# Patient Record
Sex: MALE | Race: BLACK OR AFRICAN AMERICAN | NOT HISPANIC OR LATINO | Employment: FULL TIME | URBAN - METROPOLITAN AREA
[De-identification: names, ages, dates, MRNs, and addresses within clinical notes are randomized per-mention and may not be internally consistent; named-entity substitution may affect disease eponyms.]

---

## 2017-04-09 ENCOUNTER — HOSPITAL ENCOUNTER (EMERGENCY)
Facility: HOSPITAL | Age: 32
Discharge: HOME | End: 2017-04-09
Attending: FAMILY MEDICINE

## 2017-04-09 VITALS
SYSTOLIC BLOOD PRESSURE: 127 MMHG | OXYGEN SATURATION: 97 % | HEIGHT: 69 IN | HEART RATE: 54 BPM | RESPIRATION RATE: 20 BRPM | DIASTOLIC BLOOD PRESSURE: 75 MMHG | BODY MASS INDEX: 23.99 KG/M2 | WEIGHT: 162 LBS | TEMPERATURE: 98 F

## 2017-04-09 DIAGNOSIS — R07.9 CHEST PAIN: ICD-10-CM

## 2017-04-09 DIAGNOSIS — R07.9 CHEST PAIN, UNSPECIFIED TYPE: Primary | ICD-10-CM

## 2017-04-09 LAB
ALBUMIN SERPL BCP-MCNC: 4.1 G/DL
ALP SERPL-CCNC: 64 U/L
ALT SERPL W/O P-5'-P-CCNC: 14 U/L
ANION GAP SERPL CALC-SCNC: 11 MMOL/L
AST SERPL-CCNC: 22 U/L
BASOPHILS # BLD AUTO: 0.04 K/UL
BASOPHILS NFR BLD: 0.6 %
BILIRUB SERPL-MCNC: 0.9 MG/DL
BUN SERPL-MCNC: 15 MG/DL
CALCIUM SERPL-MCNC: 9.6 MG/DL
CHLORIDE SERPL-SCNC: 101 MMOL/L
CO2 SERPL-SCNC: 26 MMOL/L
CREAT SERPL-MCNC: 1.1 MG/DL
DIFFERENTIAL METHOD: ABNORMAL
EOSINOPHIL # BLD AUTO: 0.2 K/UL
EOSINOPHIL NFR BLD: 2.2 %
ERYTHROCYTE [DISTWIDTH] IN BLOOD BY AUTOMATED COUNT: 12.7 %
EST. GFR  (AFRICAN AMERICAN): >60 ML/MIN/1.73 M^2
EST. GFR  (NON AFRICAN AMERICAN): >60 ML/MIN/1.73 M^2
GLUCOSE SERPL-MCNC: 108 MG/DL
HCT VFR BLD AUTO: 42.7 %
HGB BLD-MCNC: 15 G/DL
LIPASE SERPL-CCNC: 33 U/L
LYMPHOCYTES # BLD AUTO: 2.4 K/UL
LYMPHOCYTES NFR BLD: 33.3 %
MCH RBC QN AUTO: 34.1 PG
MCHC RBC AUTO-ENTMCNC: 35.1 %
MCV RBC AUTO: 97 FL
MONOCYTES # BLD AUTO: 0.9 K/UL
MONOCYTES NFR BLD: 13.1 %
NEUTROPHILS # BLD AUTO: 3.6 K/UL
NEUTROPHILS NFR BLD: 50.7 %
PLATELET # BLD AUTO: 226 K/UL
PMV BLD AUTO: 8.8 FL
POTASSIUM SERPL-SCNC: 4.1 MMOL/L
PROT SERPL-MCNC: 7.8 G/DL
RBC # BLD AUTO: 4.4 M/UL
SODIUM SERPL-SCNC: 138 MMOL/L
TROPONIN I SERPL DL<=0.01 NG/ML-MCNC: <0.006 NG/ML
WBC # BLD AUTO: 7.12 K/UL

## 2017-04-09 PROCEDURE — 83690 ASSAY OF LIPASE: CPT

## 2017-04-09 PROCEDURE — 80053 COMPREHEN METABOLIC PANEL: CPT

## 2017-04-09 PROCEDURE — 84484 ASSAY OF TROPONIN QUANT: CPT

## 2017-04-09 PROCEDURE — 85025 COMPLETE CBC W/AUTO DIFF WBC: CPT

## 2017-04-09 PROCEDURE — 93010 ELECTROCARDIOGRAM REPORT: CPT | Mod: ,,, | Performed by: INTERNAL MEDICINE

## 2017-04-09 PROCEDURE — 99285 EMERGENCY DEPT VISIT HI MDM: CPT | Mod: ,,, | Performed by: EMERGENCY MEDICINE

## 2017-04-09 PROCEDURE — 96360 HYDRATION IV INFUSION INIT: CPT

## 2017-04-09 PROCEDURE — 25000003 PHARM REV CODE 250: Performed by: PHYSICIAN ASSISTANT

## 2017-04-09 PROCEDURE — 99284 EMERGENCY DEPT VISIT MOD MDM: CPT | Mod: 25

## 2017-04-09 PROCEDURE — 93005 ELECTROCARDIOGRAM TRACING: CPT

## 2017-04-09 RX ORDER — NAPROXEN 500 MG/1
500 TABLET ORAL 2 TIMES DAILY WITH MEALS
Qty: 14 TABLET | Refills: 0 | Status: SHIPPED | OUTPATIENT
Start: 2017-04-09 | End: 2017-04-16

## 2017-04-09 RX ADMIN — SODIUM CHLORIDE 1000 ML: 0.9 INJECTION, SOLUTION INTRAVENOUS at 05:04

## 2017-04-09 NOTE — ED NOTES
LOC: The patient is awake, alert and aware of environment with an appropriate affect, the patient is oriented x 3 and speaking appropriately.  APPEARANCE: Patient resting comfortably and in no acute distress, patient is clean and well groomed, patient's clothing is properly fastened.  SKIN: The skin is warm and dry, patient has normal skin turgor and moist mucus membranes, skin intact, no breakdown or brusing noted.  MUSKULOSKELETAL: Patient moving all extremities well, no obvious swelling or deformities noted.  RESPIRATORY: Airway is open and patent, respirations are spontaneous, patient has a normal effort and rate.   NEURO: No neuro deficits. Speech is clear.

## 2017-04-09 NOTE — ED PROVIDER NOTES
"Encounter Date: 4/9/2017       History     Chief Complaint   Patient presents with    Chest Pain     Review of patient's allergies indicates:  No Known Allergies  HPI Comments: 32 y/o male with history of asthma presents to the ER with chief complaint of chest pain intermittent for a few months.  The pain moves from the mid chest, to the left upper and left lower chest.  He describes tightness and pressure or a "plucking or twisting" pain. The pain became more frequent today so he came in for evaluation. He reports associated shallow breathing at time.  His pain usually lasts for a few seconds then resolves without treatment.    The patient finds that stress usually brings on his pain.  His pain is sometimes worse with movements, but denies change in pain with exertion or eating.    The patient is working multiple jobs and says he has difficult coworkers.  Patient reports increasing anxiety over the last 4 months.   The patient says he was admitted 8 months ago for pancreatitis.  He has decreased the amount of alcohol since then and is not currently experiencing abdominal pain.    He denies fever, nausea, vomiting.  He had a normal bowel movement today.        Past Medical History:   Diagnosis Date    Asthma     Back muscle spasm      History reviewed. No pertinent surgical history.  Family History   Problem Relation Age of Onset    Diabetes Mother      Social History   Substance Use Topics    Smoking status: Current Some Day Smoker     Types: Cigarettes    Smokeless tobacco: Never Used      Comment: Approximately 1 pack every 2-3 days    Alcohol use Yes      Comment: 3-4 days per week      Review of Systems   Constitutional: Positive for chills. Negative for fever.   HENT: Negative for sore throat.    Respiratory: Negative for cough, chest tightness, shortness of breath and wheezing.    Cardiovascular: Positive for chest pain. Negative for palpitations.   Gastrointestinal: Negative for abdominal pain, blood " in stool, diarrhea, nausea and vomiting.   Genitourinary: Negative for dysuria.   Musculoskeletal: Negative for back pain.   Skin: Negative for rash.   Neurological: Negative for dizziness, syncope, weakness and light-headedness.   Hematological: Does not bruise/bleed easily.   Psychiatric/Behavioral: Negative for confusion.       Physical Exam   Initial Vitals   BP Pulse Resp Temp SpO2   04/09/17 1505 04/09/17 1505 04/09/17 1505 04/09/17 1505 04/09/17 1505   113/57 69 17 98.2 °F (36.8 °C) 97 %     Physical Exam    Constitutional: He appears well-developed and well-nourished.   HENT:   Head: Atraumatic.   Mouth/Throat: Oropharynx is clear and moist.   Eyes: Conjunctivae and EOM are normal. Pupils are equal, round, and reactive to light.   Neck: Normal range of motion. Neck supple.   Cardiovascular: Normal rate and regular rhythm.   Pulmonary/Chest: Breath sounds normal. No respiratory distress. He has no wheezes. He has no rhonchi. He has no rales. He exhibits tenderness.   Abdominal: Soft. Bowel sounds are normal. There is tenderness in the epigastric area. There is no rigidity, no rebound, no guarding and no CVA tenderness.   Neurological: He is alert and oriented to person, place, and time.   Skin: No rash noted.   Psychiatric: He has a normal mood and affect.         ED Course   Procedures  Labs Reviewed   CBC W/ AUTO DIFFERENTIAL - Abnormal; Notable for the following:        Result Value    RBC 4.40 (*)     MCH 34.1 (*)     MPV 8.8 (*)     All other components within normal limits   COMPREHENSIVE METABOLIC PANEL   TROPONIN I   LIPASE     EKG Readings: (Independently Interpreted)   Sinus, no acute st elevation, no ectopy       X-Rays:   Independently Interpreted Readings:   Other Readings:  Cxr:  No acute infiltrate, consolidation or effusion.            APC / Resident Notes:   Patient presents for evaluation of chest pain intermittent for 3-4 months.  Patient notes that his pain is often related to stress and  anxiety.  Patient has increased frequency of chest pain today so he came into the ER for evaluation.  DDX:  anxiety, Costochondritis, pancreatitis.  Patient has no exertional pain and no cardiac risk factors  and I have lower suspicion for ACS.   Patients lipase is normal and is he is not complaining of abdominal pain currently.  I do not suspect pancreatitis.    Patients labs are unremarkable, Troponin is negative.  EKG shows normal sinus rhythm without evidence of ischemia or arrhythmia.  Patient's chest x-rays negative for acute or infectious process.   Remainder patient's labs are unremarkable.  Patient will be prescribed naproxen for possible chest wall pain.  I've also instructed that he follow-up with a primary care physician within 1 week to discuss increased stress and anxiety.  He has no suicidal or homicidal ideation I do not see an indication for emergent psych evaluation.  Patient is stable for discharge.  He is given specific return precautions.  He is comfortable with discharge plan.  I discussed the care of this patient with my supervising MD, and the patient was also seen by him.           Attending Attestation:     Physician Attestation Statement for NP/PA:   I have conducted a face to face encounter with this patient in addition to the NP/PA, due to Medical Complexity    Other NP/PA Attestation Additions:    History of Present Illness: Daily pain for years, always brought on when stressed out  Hurts more when he presses on the L chest  No trauma  No fevers  No edema  No syncope  No palpitations  Has not previously gotten it evaluated  Was the same as usual today w/ respect to location/character but hurt more than normal, prompting the visit for evaluation  Older relatives (50's/60's) have had heart disease but nobody younger than that   Physical Exam: Nad, wdwn  Ctab, L chest wall tender, reproduces sxs, no crepitus/ecchymosis/rash  Rrr, nl s1/2, no murmur, no gallop  No jvd  abd nontender, no  hsm  No spinal tenderness  No cvat  aox3  No edema   Medical Decision Making: Longstanding, non-exertional cp that seems to be provoked by certain situations.  No family hx of early cad.  Pain is reproducible.  Very low suspicion for acs, dissection, pe, ptx, chf.  Suspect, msk, gi, anxiety.   Screening studies, re-eval.  Trial of nsaids if screening studies unremarkable.                 ED Course     Clinical Impression:   The primary encounter diagnosis was Chest pain, unspecified type. A diagnosis of Chest pain was also pertinent to this visit.          YONANA Nance  04/09/17 1910       Justice Rosen MD  04/11/17 2651

## 2017-04-09 NOTE — ED AVS SNAPSHOT
[facility]  [address]  [facility] [address]               Orion SOLO Sharifa   2017  4:02 PM   ED    Description:  Male : 1985   Department:  [facility]           Your Care was Coordinated By:     Provider Role From To    Jeffry Reza MD Attending Provider 17 1615 17 1634    Justice Rosen MD Attending Provider 17 1634 --    YOANNA Nance Physician Assistant 17 1634 --      Reason for Visit     Chest Pain           Diagnoses this Visit        Comments    Chest pain, unspecified type    -  Primary     Chest pain           ED Disposition     None           To Do List           Follow-up Information     Follow up with [facility]. Call in 1 day.    Why:  To discuss ER visit and schedule follow up appointment within 1 week    Contact information:    [address] [facility] [address]  [phone]         These Medications        Disp Refills Start End    naproxen (NAPROSYN) 500 MG tablet 14 tablet 0 2017    Take 1 tablet (500 mg total) by mouth 2 (two) times daily with meals. - Oral      [facility]     [facility] Care Line -  Assistance  Unless otherwise directed by your provider, please contact [facility], our nurse care line that is available for  assistance.     Registered nurses in the [facility] Center provide: appointment scheduling, clinical advisement, health education, and other advisory services.  Call: [phone] (toll free)               Medications           Message regarding Medications     Verify the changes and/or additions to your medication regime listed below are the same as discussed with your clinician today.  If any of these changes or additions are incorrect, please notify your healthcare provider.        START taking these NEW medications        Refills    naproxen (NAPROSYN) 500 MG tablet 0    Sig:  "Take 1 tablet (500 mg total) by mouth 2 (two) times daily with meals.    Class: Print    Route: Oral      These medications were administered today        Dose Freq    sodium chloride 0.9% bolus 1,000 mL 1,000 mL ED 1 Time    Sig: Inject 1,000 mLs into the vein ED 1 Time.    Class: Normal    Route: Intravenous    Cosign for Ordering: Accepted by Justice Rosen MD on 4/9/2017  4:55 PM           Verify that the below list of medications is an accurate representation of the medications you are currently taking.  If none reported, the list may be blank. If incorrect, please contact your healthcare provider. Carry this list with you in case of emergency.           Current Medications     naproxen (NAPROSYN) 500 MG tablet Take 1 tablet (500 mg total) by mouth 2 (two) times daily with meals.    ondansetron (ZOFRAN-ODT) 8 MG TbDL Take 1 tablet (8 mg total) by mouth every 8 (eight) hours as needed. FOR NAUSEA OR VOMITING           Clinical Reference Information           Your Vitals Were     BP Pulse Temp Resp Height Weight    113/57 (BP Location: Right arm, Patient Position: Sitting) 69 98.2 °F (36.8 °C) (Oral) 17 5' 9" (1.753 m) 73.5 kg (162 lb)    SpO2 BMI             97% 23.92 kg/m2         Allergies as of 4/9/2017     No Known Allergies      Immunizations Administered on Date of Encounter - 4/9/2017     None      ED Micro, Lab, POCT     Start Ordered       Status Ordering Provider    04/09/17 1628 04/09/17 1627  CBC auto differential  STAT      Final result     04/09/17 1628 04/09/17 1627  Comprehensive metabolic panel  STAT      Final result     04/09/17 1628 04/09/17 1627  Troponin I  STAT      Final result     04/09/17 1628 04/09/17 1627  Lipase  STAT      Final result       ED Imaging Orders     Start Ordered       Status Ordering Provider    04/09/17 1628 04/09/17 1627  X-Ray Chest PA And Lateral  1 time imaging      Final result         Discharge Instructions         Uncertain Causes of Chest Pain    Chest " pain can happen for a number of reasons. Sometimes the cause can't be determined. If your condition does not seem serious, and your pain does not appear to be coming from your heart, your healthcare provider may recommend watching it closely. Sometimes the signs of a serious problem take more time to appear. Many problems not related to your heart can cause chest pain.These include:  · Musculoskeletal. Costochondritis, an inflammation of the tissues around the ribs that can occur from trauma or overuse injuries  · Respiratory. Pneumonia, pneumothorax, or pneumonitis (inflammation of the lining of the chest and lungs)  · Gastrointestinal. Esophageal reflux, heartburn, or gallbladder disease  · Anxiety and panic disorders  · Nerve compression and neuritis  · Miscellaneous problems such as aortic aneurysm or pulmonary embolism (a blood clot in the lungs)  Home care  After your visit, follow these recommendations:  · Rest today and avoid strenuous activity.  · Take any prescribed medicine as directed.  · Be aware of any recurrent chest pain and notice any changes  Follow-up care  Follow up with your healthcare provider if you do not start to feel better within 24 hours, or as advised.  Call 911  Call 911 if any of these occur:  · A change in the type of pain: if it feels different, becomes more severe, lasts longer, or begins to spread into your shoulder, arm, neck, jaw or back  · Shortness of breath or increased pain with breathing  · Weakness, dizziness, or fainting  · Rapid heart beat  · Crushing sensation in your chest  When to seek medical advice  Call your healthcare provider right away if any of the following occur:  · Cough with dark colored sputum (phlegm) or blood  · Fever of 100.4ºF (38ºC) or higher, or as directed by your healthcare provider  · Swelling, pain or redness in one leg  · Shortness of breath  Date Last Reviewed: 12/30/2015  © 2226-8159 [employer]. [address] [place] [address]. All rights reserved. This information is not intended as a substitute for professional medical care. Always follow your healthcare professional's instructions.        Possible Chest Wall Pain: Costochondritis    The chest pain that you have had today is caused by costochondritis. This condition is caused by an inflammation of the cartilage joining your ribs to your breastbone. It is not caused by heart or lung problems. The inflammation may have been brought on by a blow to the chest, lifting heavy objects, intense exercise, or an illness that made you cough and sneeze. It often occurs during times of emotional stress. It can be painful, but it is not dangerous. It usually goes away in 1 to 2 weeks. But it may happen again. Rarely, a more serious condition may cause symptoms similar to costochondritis. Thats why its important to watch for the warning signs listed below.  Home care  Follow these guidelines when caring for yourself at home:  · If you feel that emotional stress is a cause of your condition, try to figure out the sources of that stress. It may not be obvious! Learn ways to deal with the stress in your life. This can include regular exercise, muscle relaxation, meditation, or simply taking time out for yourself. For more information about this, talk with your health care provider. Or go to your local library and look at books on stress reduction.  · You may use acetaminophen or ibuprofen to control pain, unless another pain medicine was prescribed. If you have liver disease or ever had a stomach ulcer, talk with your health care provider before using these medicines.  · You can also help ease pain by using a hot, wet compress or heating pad. Use this with or without a medicated skin cream that helps relieves pain.  · Do stretching exercise as advised by your provider.  · Take any prescribed medicines as directed.  Follow-up care  Follow up with your health care provider, or as advised, if  you do not start to get better in the next 2 days.  When to seek medical advice  Call your health care provider right away if any of these occur:  · A change in the type of pain. Call if it feels different, becomes more serious, lasts longer, or spreads into your shoulder, arm, neck, jaw, or back.  · Shortness of breath or pain gets worse when you breathe  · Weakness, dizziness, or fainting  · Cough with dark-colored sputum (phlegm) or blood  · Abdominal pain  · Dark red or black stools  · Fever of 100.4ºF (38ºC) or higher, or as directed by your health care provider  Date Last Reviewed: 11/24/2014  © 4606-5897 [employer]. [address] [place] [address], [place] [address]. All rights reserved. This information is not intended as a substitute for professional medical care. Always follow your healthcare professional's instructions.              [facility] Sign-Up     Activating your [facility] account is as easy as 1-2-3!     1) Visit [email or web address], select Sign Up Now, enter this activation code and your date of birth, then select Next.  [email or web address]  Expires: 5/24/2017  5:59 PM      2) Create a username and password to use when you visit [facility] in the future and select a security question in case you lose your password and select Next.    3) Enter your e-mail address and click Sign Up!    Additional Information  If you have questions, please e-mail [email or web address] or call [phone] to talk to our [facility] staff. Remember, [facility] is NOT to be used for urgent needs. For medical emergencies, dial 911.         Smoking Cessation     If you would like to quit smoking:   You may be eligible for free services if you are a [place] resident and started smoking cigarettes before September 1, 1988.  Call the Smoking Cessation Trust (SCT) toll free at (822) 503-0476 or (115) 783-6648.   Call 1-800-QUIT-NOW if you do not meet the above criteria.   Contact us via email:  [email or web address]   View our website for more information: [email or web address]         [facility] complies with applicable Federal civil rights laws and does not discriminate on the basis of race, color, national origin, age, disability, or sex.        Language Assistance Services     ATTENTION: Language assistance services are available, free of charge. Please call [phone].      ATENCIÓN: Si habla español, tiene a bailey disposición servicios gratuitos de asistencia lingüística. Llame al [phone].     CHÚ Ý: N?u b?n nói Ti?ng Vi?t, có các d?ch v? h? tr? ngôn ng? mi?n phí dành cho b?n. G?i s? [phone].

## 2017-04-09 NOTE — DISCHARGE INSTRUCTIONS
Uncertain Causes of Chest Pain    Chest pain can happen for a number of reasons. Sometimes the cause can't be determined. If your condition does not seem serious, and your pain does not appear to be coming from your heart, your healthcare provider may recommend watching it closely. Sometimes the signs of a serious problem take more time to appear. Many problems not related to your heart can cause chest pain.These include:  · Musculoskeletal. Costochondritis, an inflammation of the tissues around the ribs that can occur from trauma or overuse injuries  · Respiratory. Pneumonia, pneumothorax, or pneumonitis (inflammation of the lining of the chest and lungs)  · Gastrointestinal. Esophageal reflux, heartburn, or gallbladder disease  · Anxiety and panic disorders  · Nerve compression and neuritis  · Miscellaneous problems such as aortic aneurysm or pulmonary embolism (a blood clot in the lungs)  Home care  After your visit, follow these recommendations:  · Rest today and avoid strenuous activity.  · Take any prescribed medicine as directed.  · Be aware of any recurrent chest pain and notice any changes  Follow-up care  Follow up with your healthcare provider if you do not start to feel better within 24 hours, or as advised.  Call 911  Call 911 if any of these occur:  · A change in the type of pain: if it feels different, becomes more severe, lasts longer, or begins to spread into your shoulder, arm, neck, jaw or back  · Shortness of breath or increased pain with breathing  · Weakness, dizziness, or fainting  · Rapid heart beat  · Crushing sensation in your chest  When to seek medical advice  Call your healthcare provider right away if any of the following occur:  · Cough with dark colored sputum (phlegm) or blood  · Fever of 100.4ºF (38ºC) or higher, or as directed by your healthcare provider  · Swelling, pain or redness in one leg  · Shortness of breath  Date Last Reviewed: 12/30/2015  © 7757-0107 [facility]  [employer]. [address] [place] [address], [place] [address]. All rights reserved. This information is not intended as a substitute for professional medical care. Always follow your healthcare professional's instructions.        Possible Chest Wall Pain: Costochondritis    The chest pain that you have had today is caused by costochondritis. This condition is caused by an inflammation of the cartilage joining your ribs to your breastbone. It is not caused by heart or lung problems. The inflammation may have been brought on by a blow to the chest, lifting heavy objects, intense exercise, or an illness that made you cough and sneeze. It often occurs during times of emotional stress. It can be painful, but it is not dangerous. It usually goes away in 1 to 2 weeks. But it may happen again. Rarely, a more serious condition may cause symptoms similar to costochondritis. Thats why its important to watch for the warning signs listed below.  Home care  Follow these guidelines when caring for yourself at home:  · If you feel that emotional stress is a cause of your condition, try to figure out the sources of that stress. It may not be obvious! Learn ways to deal with the stress in your life. This can include regular exercise, muscle relaxation, meditation, or simply taking time out for yourself. For more information about this, talk with your health care provider. Or go to your local library and look at books on stress reduction.  · You may use acetaminophen or ibuprofen to control pain, unless another pain medicine was prescribed. If you have liver disease or ever had a stomach ulcer, talk with your health care provider before using these medicines.  · You can also help ease pain by using a hot, wet compress or heating pad. Use this with or without a medicated skin cream that helps relieves pain.  · Do stretching exercise as advised by your provider.  · Take any prescribed medicines as directed.  Follow-up care  Follow up with  your health care provider, or as advised, if you do not start to get better in the next 2 days.  When to seek medical advice  Call your health care provider right away if any of these occur:  · A change in the type of pain. Call if it feels different, becomes more serious, lasts longer, or spreads into your shoulder, arm, neck, jaw, or back.  · Shortness of breath or pain gets worse when you breathe  · Weakness, dizziness, or fainting  · Cough with dark-colored sputum (phlegm) or blood  · Abdominal pain  · Dark red or black stools  · Fever of 100.4ºF (38ºC) or higher, or as directed by your health care provider  Date Last Reviewed: 11/24/2014  © 8859-1675 [employer]. [address] [place] [address], [place] [address]. All rights reserved. This information is not intended as a substitute for professional medical care. Always follow your healthcare professional's instructions.

## 2017-04-09 NOTE — ED TRIAGE NOTES
Pt to ER for chest pain for several months. Pt reports anxiety and panic attacks but does not take medication for it.

## 2017-04-09 NOTE — PROVIDER PROGRESS NOTES - EMERGENCY DEPT.
Encounter Date: 4/9/2017    ED Physician Progress Notes        Physician Note:   Patient is 31-year-old male with a history of intermittent chest pain for several months.  Today, the pain was worrisome to him and he came to the emergency room to ensure he did not have something dangerous going on.  He has had pain related to stress at times.  He denies any significant factors which improve or worsen the pain, specifically denies and nausea, vomiting or pain related to food.  He does have a history of asthma, but states that this does not feel anything like his prior asthma episodes.  On exam, his heart and lung and chest wall exam are benign.  Do a standard noninvasive ER cardiac workup with enzymes, EKG, chest x-ray and referred to the main ED for final disposition once the status of return.

## 2017-06-05 ENCOUNTER — HOSPITAL ENCOUNTER (EMERGENCY)
Facility: HOSPITAL | Age: 32
Discharge: HOME | End: 2017-06-05
Attending: EMERGENCY MEDICINE | Admitting: EMERGENCY MEDICINE

## 2017-06-05 VITALS
BODY MASS INDEX: 22.9 KG/M2 | RESPIRATION RATE: 18 BRPM | WEIGHT: 160 LBS | HEART RATE: 71 BPM | DIASTOLIC BLOOD PRESSURE: 66 MMHG | TEMPERATURE: 99 F | HEIGHT: 70 IN | OXYGEN SATURATION: 99 % | SYSTOLIC BLOOD PRESSURE: 129 MMHG

## 2017-06-05 DIAGNOSIS — R25.1 TREMOR: Primary | ICD-10-CM

## 2017-06-05 DIAGNOSIS — R53.1 GENERALIZED WEAKNESS: ICD-10-CM

## 2017-06-05 LAB
ALBUMIN SERPL BCP-MCNC: 4 G/DL
ALP SERPL-CCNC: 68 U/L
ALT SERPL W/O P-5'-P-CCNC: 18 U/L
AMPHET+METHAMPHET UR QL: NEGATIVE
ANION GAP SERPL CALC-SCNC: 14 MMOL/L
AST SERPL-CCNC: 29 U/L
BARBITURATES UR QL SCN>200 NG/ML: NEGATIVE
BASOPHILS # BLD AUTO: 0.01 K/UL
BASOPHILS NFR BLD: 0.1 %
BENZODIAZ UR QL SCN>200 NG/ML: NEGATIVE
BILIRUB SERPL-MCNC: 0.7 MG/DL
BILIRUB UR QL STRIP: NEGATIVE
BUN SERPL-MCNC: 16 MG/DL
BZE UR QL SCN: NORMAL
CALCIUM SERPL-MCNC: 9.5 MG/DL
CANNABINOIDS UR QL SCN: NORMAL
CHLORIDE SERPL-SCNC: 98 MMOL/L
CLARITY UR REFRACT.AUTO: CLEAR
CO2 SERPL-SCNC: 23 MMOL/L
COLOR UR AUTO: YELLOW
CREAT SERPL-MCNC: 1 MG/DL
CREAT UR-MCNC: 123 MG/DL
DIFFERENTIAL METHOD: ABNORMAL
EOSINOPHIL # BLD AUTO: 0 K/UL
EOSINOPHIL NFR BLD: 0.1 %
ERYTHROCYTE [DISTWIDTH] IN BLOOD BY AUTOMATED COUNT: 12.9 %
EST. GFR  (AFRICAN AMERICAN): >60 ML/MIN/1.73 M^2
EST. GFR  (NON AFRICAN AMERICAN): >60 ML/MIN/1.73 M^2
ETHANOL SERPL-MCNC: <10 MG/DL
ETHANOL UR-MCNC: <10 MG/DL
GLUCOSE SERPL-MCNC: 105 MG/DL
GLUCOSE UR QL STRIP: NEGATIVE
HCT VFR BLD AUTO: 44.9 %
HGB BLD-MCNC: 15.1 G/DL
HGB UR QL STRIP: NEGATIVE
KETONES UR QL STRIP: ABNORMAL
LEUKOCYTE ESTERASE UR QL STRIP: NEGATIVE
LIPASE SERPL-CCNC: 26 U/L
LYMPHOCYTES # BLD AUTO: 1.3 K/UL
LYMPHOCYTES NFR BLD: 12.7 %
MCH RBC QN AUTO: 33.7 PG
MCHC RBC AUTO-ENTMCNC: 33.6 %
MCV RBC AUTO: 100 FL
METHADONE UR QL SCN>300 NG/ML: NEGATIVE
MONOCYTES # BLD AUTO: 0.6 K/UL
MONOCYTES NFR BLD: 6 %
NEUTROPHILS # BLD AUTO: 8.4 K/UL
NEUTROPHILS NFR BLD: 80.8 %
NITRITE UR QL STRIP: NEGATIVE
OPIATES UR QL SCN: NEGATIVE
PCP UR QL SCN>25 NG/ML: NEGATIVE
PH UR STRIP: 5 [PH] (ref 5–8)
PLATELET # BLD AUTO: 226 K/UL
PMV BLD AUTO: 8.7 FL
POTASSIUM SERPL-SCNC: 3.4 MMOL/L
PROT SERPL-MCNC: 7.8 G/DL
PROT UR QL STRIP: NEGATIVE
RBC # BLD AUTO: 4.48 M/UL
SODIUM SERPL-SCNC: 135 MMOL/L
SP GR UR STRIP: 1.01 (ref 1–1.03)
TOXICOLOGY INFORMATION: NORMAL
URN SPEC COLLECT METH UR: ABNORMAL
UROBILINOGEN UR STRIP-ACNC: NEGATIVE EU/DL
WBC # BLD AUTO: 10.36 K/UL

## 2017-06-05 PROCEDURE — 99285 EMERGENCY DEPT VISIT HI MDM: CPT | Mod: ,,, | Performed by: EMERGENCY MEDICINE

## 2017-06-05 PROCEDURE — 63600175 PHARM REV CODE 636 W HCPCS: Performed by: EMERGENCY MEDICINE

## 2017-06-05 PROCEDURE — 96374 THER/PROPH/DIAG INJ IV PUSH: CPT

## 2017-06-05 PROCEDURE — 80053 COMPREHEN METABOLIC PANEL: CPT

## 2017-06-05 PROCEDURE — 80307 DRUG TEST PRSMV CHEM ANLYZR: CPT

## 2017-06-05 PROCEDURE — 80320 DRUG SCREEN QUANTALCOHOLS: CPT

## 2017-06-05 PROCEDURE — 83690 ASSAY OF LIPASE: CPT

## 2017-06-05 PROCEDURE — 81003 URINALYSIS AUTO W/O SCOPE: CPT

## 2017-06-05 PROCEDURE — 99284 EMERGENCY DEPT VISIT MOD MDM: CPT | Mod: 25

## 2017-06-05 PROCEDURE — 25000003 PHARM REV CODE 250: Performed by: EMERGENCY MEDICINE

## 2017-06-05 PROCEDURE — 85025 COMPLETE CBC W/AUTO DIFF WBC: CPT

## 2017-06-05 RX ORDER — CLONAZEPAM 0.5 MG/1
0.25 TABLET ORAL DAILY
COMMUNITY
End: 2017-06-05

## 2017-06-05 RX ORDER — ARIPIPRAZOLE 10 MG/1
5 TABLET ORAL DAILY
COMMUNITY
End: 2017-06-05

## 2017-06-05 RX ORDER — NAPROXEN SODIUM 220 MG/1
81 TABLET, FILM COATED ORAL DAILY
Qty: 30 TABLET | Refills: 0 | COMMUNITY
Start: 2017-06-05 | End: 2018-11-16 | Stop reason: CLARIF

## 2017-06-05 RX ADMIN — FOLIC ACID: 5 INJECTION, SOLUTION INTRAMUSCULAR; INTRAVENOUS; SUBCUTANEOUS at 05:06

## 2017-06-05 NOTE — ED PROVIDER NOTES
"Encounter Date: 6/5/2017    SCRIBE #1 NOTE: I, Marisela Bustamante, am scribing for, and in the presence of, Dr. Shanks.       History     Chief Complaint   Patient presents with    Seizures     Pt states he believes he had a seizure this morning.  Pt reporting an episode of being unable to respond to his brother and he was shaking all over; pt was awake during the episode.  No hx of seizures.     Review of patient's allergies indicates:  No Known Allergies  Time seen by provider: 3:31 PM    This is a 31 y.o. male with a history of asthma and back muscle spasm who presents for evaluation after an episode of bilateral hand tremors, speech difficulty, and weakness a few hours ago. Patient explains during this episode, he could not communicate with his brother. He states he could understand what his brother was saying, but he could not verbally respond for about 2 minutes. He started feeling nauseous but was weak, and his brother had to drag him to the bathroom because he was too weak to stand. He reports vomiting at this time. He then sat in the shower, and his wife had to assist him in coming to the ED. At this time, he describes feeling in an "in and out daze." Patient admits smoking marijuana, but denies other drug use. He endorses drinking a cup of whiskey last night, but did not wake up feeling hung over. Patient denies daily alcohol use, stating he drinks every few days. He denies a history of seizures.         The history is provided by the patient and medical records.     Past Medical History:   Diagnosis Date    Asthma     Back muscle spasm      History reviewed. No pertinent surgical history.  Family History   Problem Relation Age of Onset    Diabetes Mother      Social History   Substance Use Topics    Smoking status: Current Some Day Smoker     Packs/day: 0.25     Types: Cigarettes    Smokeless tobacco: Never Used      Comment: Approximately 1 pack every 2-3 days    Alcohol use 0.6 - 1.2 oz/week     1 - 2 " Standard drinks or equivalent per week      Comment: 3-4 days per week ; generally liquor     Review of Systems   Constitutional: Negative for fever.   HENT: Negative for nosebleeds.    Eyes: Negative for pain.   Respiratory: Negative for cough.    Cardiovascular: Negative for chest pain.   Gastrointestinal: Positive for nausea and vomiting.   Genitourinary: Negative for hematuria.   Musculoskeletal: Negative for neck pain.   Skin: Negative for rash.   Neurological: Positive for tremors (bilateral hands, resolved), speech difficulty (resolved) and weakness.       Physical Exam     Initial Vitals [06/05/17 1443]   BP Pulse Resp Temp SpO2   129/66 71 18 98.6 °F (37 °C) 99 %     Physical Exam    Nursing note and vitals reviewed.  Constitutional: He appears well-developed and well-nourished. He is not diaphoretic. No distress.   Eyes: EOM are normal. Pupils are equal, round, and reactive to light.   Cardiovascular: Normal rate, regular rhythm, normal heart sounds and intact distal pulses.   Pulmonary/Chest: Breath sounds normal. No respiratory distress. He has no wheezes. He has no rhonchi. He has no rales.   Abdominal: Soft. There is generalized tenderness (mild).   Musculoskeletal: Normal range of motion.   Neurological: He is alert and oriented to person, place, and time. He has normal strength. No cranial nerve deficit or sensory deficit. He displays a negative Romberg sign. Coordination and gait normal.   Negative Pronator.    Skin: Skin is warm and dry.         ED Course   Procedures  Labs Reviewed   CBC W/ AUTO DIFFERENTIAL - Abnormal; Notable for the following:        Result Value    RBC 4.48 (*)      (*)     MCH 33.7 (*)     MPV 8.7 (*)     Gran # 8.4 (*)     Gran% 80.8 (*)     Lymph% 12.7 (*)     All other components within normal limits   COMPREHENSIVE METABOLIC PANEL - Abnormal; Notable for the following:     Sodium 135 (*)     Potassium 3.4 (*)     All other components within normal limits    URINALYSIS, REFLEX TO URINE CULTURE - Abnormal; Notable for the following:     Ketones, UA 1+ (*)     All other components within normal limits   ALCOHOL,MEDICAL (ETHANOL)   TOXICOLOGY SCREEN, URINE, RANDOM (COMPLIANCE)   LIPASE        Imaging Results          CT Head Without Contrast (Final result)  Result time 06/05/17 18:01:05    Final result by Marin Gutierrez MD (06/05/17 18:01:05)                 Impression:        No acute intracranial abnormality identified.  Specifically, no hemorrhage seen.      Electronically signed by: MARIN GUTIERREZ MD, MD  Date:     06/05/17  Time:    18:01              Narrative:    COMPARISON: None    FINDINGS: The brain appears normally formed without evidence of hemorrhage, mass, midline shift or acute major vascular territory infarct.  Gray-white interface appears intact.  The ventricular system is normal in size for age and demonstrates no distortion by mass effect.      No extra-axial hemorrhage or mass. The extracranial structures are within normal limits.  No displaced calvarial fracture.  Small retention cyst versus polyp within a middle right ethmoid air cell. portions of the paranasal sinuses and mastoid air cells are clear. Imaged portions of the orbits are within normal limits.                                Medical Decision Making:   History:   Old Medical Records: I decided to obtain old medical records.  Old Records Summarized: records from clinic visits.       <> Summary of Records: Patient with history of asthma and muscle spasm. Previously seen for alcohol induced pancreatitis.   Initial Assessment:   Patient with episode of bilateral tremors to the hands, difficulty talking lasting 2 minutes followed by generalized weakness that gradually improved. Patient is currently neurovascularly intact and asymptomatic. Followed by episode of binge drinking yesterday. Patient admits to smoking marijuana but denies any drug use. He was awake during the whole encounter. Denies  any garbled speech. Patient was able to understand all being said to him. He denies any focal weakness or numbness. My initial differential diagnosis includes seizure, TIA, DT, dehydration, and electrolyte imbalance. My suspicion for seizure is low as patient was awake and only had subtle tremors of hand. My suspicion for TIA is low as patient did not have any focal weakness or numbness, and no garbling speech but just difficulty getting his words out for first couple minutes upon waking. I will get CT, lab work, IV fluids, urine tox, and reassess.   Clinical Tests:   Lab Tests: Ordered and Reviewed  Radiological Study: Ordered and Reviewed  ED Management:  Updates:  6:58 PM - Urine tox positive for cocaine and THC. Serum alcohol under 10. CBC and LFT's at baseline. CT head is unremarkable. In view of negative workup, patient is asymptomatic and smiling, I will discharge with outpatient Neurology follow up. Return instructions given.            Scribe Attestation:   Scribe #1: I performed the above scribed service and the documentation accurately describes the services I performed. I attest to the accuracy of the note.    Attending Attestation:           Physician Attestation for Scribe:  Physician Attestation Statement for Scribe #1: I, Dr. Shanks, reviewed documentation, as scribed by Marisela Bustamante in my presence, and it is both accurate and complete.                 ED Course     Clinical Impression:   The primary encounter diagnosis was Tremor. A diagnosis of Generalized weakness was also pertinent to this visit.    Disposition:   Disposition: Discharged  Condition: Stable       Erendira Shanks MD  06/19/17 6244

## 2017-06-05 NOTE — ED TRIAGE NOTES
"Pt presents to the ED c c/o "having an episode" this morning. Pt states that approx 1200. Pt states that hands were shaking and that he vomited. Pt states he did have blood. In addition, pt states that he was "unable to communicate" with his brother, as if "I was staring into space and stuck in my body". Pt denies CP/SOA at this time.   "

## 2018-01-28 ENCOUNTER — HOSPITAL ENCOUNTER (EMERGENCY)
Facility: HOSPITAL | Age: 33
Discharge: HOME | End: 2018-01-28
Attending: EMERGENCY MEDICINE

## 2018-01-28 VITALS
RESPIRATION RATE: 18 BRPM | OXYGEN SATURATION: 99 % | TEMPERATURE: 98 F | HEART RATE: 73 BPM | SYSTOLIC BLOOD PRESSURE: 134 MMHG | WEIGHT: 168 LBS | HEIGHT: 70 IN | BODY MASS INDEX: 24.05 KG/M2 | DIASTOLIC BLOOD PRESSURE: 73 MMHG

## 2018-01-28 DIAGNOSIS — M54.10 RADICULAR PAIN: ICD-10-CM

## 2018-01-28 DIAGNOSIS — T14.8XXA MUSCLE STRAIN: Primary | ICD-10-CM

## 2018-01-28 PROCEDURE — 99283 EMERGENCY DEPT VISIT LOW MDM: CPT | Mod: ,,, | Performed by: EMERGENCY MEDICINE

## 2018-01-28 PROCEDURE — 96372 THER/PROPH/DIAG INJ SC/IM: CPT

## 2018-01-28 PROCEDURE — 99283 EMERGENCY DEPT VISIT LOW MDM: CPT | Mod: 25

## 2018-01-28 PROCEDURE — 63600175 PHARM REV CODE 636 W HCPCS: Performed by: STUDENT IN AN ORGANIZED HEALTH CARE EDUCATION/TRAINING PROGRAM

## 2018-01-28 RX ORDER — KETOROLAC TROMETHAMINE 30 MG/ML
15 INJECTION, SOLUTION INTRAMUSCULAR; INTRAVENOUS
Status: COMPLETED | OUTPATIENT
Start: 2018-01-28 | End: 2018-01-28

## 2018-01-28 RX ORDER — METHOCARBAMOL 750 MG/1
750 TABLET, FILM COATED ORAL 3 TIMES DAILY
Qty: 21 TABLET | Refills: 0 | Status: SHIPPED | OUTPATIENT
Start: 2018-01-28 | End: 2018-02-27

## 2018-01-28 RX ADMIN — KETOROLAC TROMETHAMINE 15 MG: 30 INJECTION, SOLUTION INTRAMUSCULAR at 09:01

## 2018-01-28 NOTE — ED PROVIDER NOTES
Encounter Date: 1/28/2018    SCRIBE #1 NOTE: I, Kya Colon, am scribing for, and in the presence of,  Dr. Cordova. I have scribed the following portions of the note - the Resident attestation.       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  Pt reports no trauma and no sudden onset of pain.   Reports decreased feeling to ulnar sided hand and forearm.  Has taken tylenol at home with minimal relief.  Denies bladder or bowel problems, difficulty with small objects, poor handwriting.        The history is provided by the patient and medical records.     Review of patient's allergies indicates:  No Known Allergies  Past Medical History:   Diagnosis Date    Asthma     Back muscle spasm      History reviewed. No pertinent surgical history.  Family History   Problem Relation Age of Onset    Diabetes Mother      Social History   Substance Use Topics    Smoking status: Current Some Day Smoker     Packs/day: 0.25     Types: Cigarettes    Smokeless tobacco: Never Used      Comment: Approximately 1 pack every 2-3 days    Alcohol use 0.6 - 1.2 oz/week     1 - 2 Standard drinks or equivalent per week      Comment: 3-4 days per week ; generally liquor     Review of Systems   Constitutional: Negative for fever.   HENT: Negative for sore throat.    Respiratory: Negative for shortness of breath.    Cardiovascular: Negative for chest pain.   Gastrointestinal: Negative for nausea.   Genitourinary: Negative for dysuria.   Musculoskeletal: Negative for back pain.   Skin: Negative for rash.   Neurological: Negative for weakness.   Hematological: Does not bruise/bleed easily.       Physical Exam     Initial Vitals [01/28/18 0913]   BP Pulse Resp Temp SpO2   134/73 73 18 98.2 °F (36.8 °C) 99 %      MAP       93.33          Physical Exam    Nursing note and vitals reviewed.  Constitutional: He appears well-developed and well-nourished.   HENT:   Head: Normocephalic and atraumatic.   Eyes: Conjunctivae and EOM are normal.   Neck: Normal range of motion. No tracheal deviation present.   Cardiovascular: Normal rate and intact distal pulses.   Pulmonary/Chest: No respiratory distress.   Abdominal: Soft. He exhibits no distension.   Musculoskeletal: Normal range of motion. He exhibits no edema.   Tenderness periscapular right   Neurological: He is alert and oriented to person, place, and time. He has normal strength and normal reflexes.   Subjective decreased feeling medial forearm and ulnar hand- digits 3-5   Skin: Skin is warm and dry.   Psychiatric: He has a normal mood and affect. His behavior is normal. Judgment and thought content normal.         ED Course   Procedures  Labs Reviewed - No data to display          Medical Decision Making:   History:   Old Medical Records: I decided to obtain old medical records.       APC / Resident Notes:   31 yo male with right sided periscapular pain as well as radicular pain to right ulnar hand. Differential diagnosis includes: muscle strain, cervical nerve impingement, rotator cuff injury.   Will treat with Toradol and muscle relaxers.  Pt instructed to take aleve BID x 7 days.  Will refer to [facility] for f/u.         Scribe Attestation:   Scribe #1: I performed the above scribed service and the documentation accurately describes the services I performed. I attest to the accuracy of the note.    Attending Attestation:   Physician Attestation Statement for Resident:  As the supervising MD   Physician Attestation Statement: I have personally seen and examined this patient.   I agree with the above history. -: 32 year old male complains of right shoulder pain for the past one seven days. It is throbbing and spasming in nature, worsened with movement. There is association with  paresthesias of the distal 3 fingers. On exam, he some parascapular tenderness with spasm. Limited ROM with shoulder secondary to pain. Sensation grossly intact. Likely has radiculopathy will treat with toradol robaxin and will refer to [facility].   As the supervising MD I agree with the above PE.    As the supervising MD I agree with the above treatment, course, plan, and disposition.          Physician Attestation for Scribe:      Comments: I, Dr. Daphne Cordova, personally performed the services described in this documentation. All medical record entries made by the scribe were at my direction and in my presence.  I have reviewed the chart and agree that the record reflects my personal performance and is accurate and complete. Daphne Cordova MD.              ED Course      Clinical Impression:   The primary encounter diagnosis was Muscle strain. A diagnosis of Radicular pain was also pertinent to this visit.    Disposition:   Disposition: Discharged  Condition: Stable                        Daphne Cordova MD  01/28/18 0277

## 2018-01-28 NOTE — ED TRIAGE NOTES
Presents to ER with pain to his right scapula for 1 week after lifting a heavy object at work last week.    Pt identifiers checked and correct  LOC: The patient is awake, alert, aware of environment with an appropriate affect. Oriented x3, speaking appropriately  APPEARANCE: Pt resting comfortably, in no acute distress, pt is clean and well groomed, clothing properly fastened  SKIN: Skin warm, dry and intact, normal skin turgor, moist mucus membranes  RESPIRATORY: Airway is open and patent, respirations are spontaneous, even and unlabored, normal effort and rate  MUSCULOSKELETAL: No obvious deformities.

## 2018-11-16 ENCOUNTER — HOSPITAL ENCOUNTER (EMERGENCY)
Facility: HOSPITAL | Age: 33
Discharge: HOME | End: 2018-11-16
Attending: EMERGENCY MEDICINE

## 2018-11-16 VITALS
OXYGEN SATURATION: 98 % | HEART RATE: 63 BPM | HEIGHT: 69 IN | TEMPERATURE: 100 F | RESPIRATION RATE: 18 BRPM | BODY MASS INDEX: 23.11 KG/M2 | WEIGHT: 156 LBS | SYSTOLIC BLOOD PRESSURE: 115 MMHG | DIASTOLIC BLOOD PRESSURE: 60 MMHG

## 2018-11-16 DIAGNOSIS — R10.9 ACUTE ABDOMINAL PAIN: Primary | ICD-10-CM

## 2018-11-16 DIAGNOSIS — K52.9 ACUTE GASTROENTERITIS: ICD-10-CM

## 2018-11-16 DIAGNOSIS — D72.829 LEUKOCYTOSIS, UNSPECIFIED TYPE: ICD-10-CM

## 2018-11-16 LAB
ALBUMIN SERPL BCP-MCNC: 4.4 G/DL
ALP SERPL-CCNC: 59 U/L
ALT SERPL W/O P-5'-P-CCNC: 18 U/L
ANION GAP SERPL CALC-SCNC: 8 MMOL/L
AST SERPL-CCNC: 24 U/L
BASOPHILS # BLD AUTO: 0.03 K/UL
BASOPHILS NFR BLD: 0.2 %
BILIRUB SERPL-MCNC: 0.7 MG/DL
BUN SERPL-MCNC: 11 MG/DL
CALCIUM SERPL-MCNC: 10 MG/DL
CHLORIDE SERPL-SCNC: 103 MMOL/L
CO2 SERPL-SCNC: 28 MMOL/L
CREAT SERPL-MCNC: 0.9 MG/DL
DIFFERENTIAL METHOD: ABNORMAL
EOSINOPHIL # BLD AUTO: 0 K/UL
EOSINOPHIL NFR BLD: 0.3 %
ERYTHROCYTE [DISTWIDTH] IN BLOOD BY AUTOMATED COUNT: 12.5 %
EST. GFR  (AFRICAN AMERICAN): >60 ML/MIN/1.73 M^2
EST. GFR  (NON AFRICAN AMERICAN): >60 ML/MIN/1.73 M^2
GLUCOSE SERPL-MCNC: 91 MG/DL
HCT VFR BLD AUTO: 44.1 %
HGB BLD-MCNC: 14.9 G/DL
IMM GRANULOCYTES # BLD AUTO: 0.05 K/UL
IMM GRANULOCYTES NFR BLD AUTO: 0.4 %
INR PPP: 1
LIPASE SERPL-CCNC: 24 U/L
LYMPHOCYTES # BLD AUTO: 0.8 K/UL
LYMPHOCYTES NFR BLD: 6 %
MCH RBC QN AUTO: 34.3 PG
MCHC RBC AUTO-ENTMCNC: 33.8 G/DL
MCV RBC AUTO: 102 FL
MONOCYTES # BLD AUTO: 0.6 K/UL
MONOCYTES NFR BLD: 4.3 %
NEUTROPHILS # BLD AUTO: 12.5 K/UL
NEUTROPHILS NFR BLD: 88.8 %
NRBC BLD-RTO: 0 /100 WBC
PLATELET # BLD AUTO: 212 K/UL
PMV BLD AUTO: 8.8 FL
POTASSIUM SERPL-SCNC: 4.2 MMOL/L
PROT SERPL-MCNC: 8 G/DL
PROTHROMBIN TIME: 10.6 SEC
RBC # BLD AUTO: 4.34 M/UL
SODIUM SERPL-SCNC: 139 MMOL/L
WBC # BLD AUTO: 14.09 K/UL

## 2018-11-16 PROCEDURE — 80053 COMPREHEN METABOLIC PANEL: CPT

## 2018-11-16 PROCEDURE — 85610 PROTHROMBIN TIME: CPT

## 2018-11-16 PROCEDURE — 25000003 PHARM REV CODE 250: Performed by: PHYSICIAN ASSISTANT

## 2018-11-16 PROCEDURE — 25500020 PHARM REV CODE 255: Performed by: EMERGENCY MEDICINE

## 2018-11-16 PROCEDURE — 85025 COMPLETE CBC W/AUTO DIFF WBC: CPT

## 2018-11-16 PROCEDURE — 99284 EMERGENCY DEPT VISIT MOD MDM: CPT | Mod: ,,, | Performed by: PHYSICIAN ASSISTANT

## 2018-11-16 PROCEDURE — 83690 ASSAY OF LIPASE: CPT

## 2018-11-16 PROCEDURE — 99285 EMERGENCY DEPT VISIT HI MDM: CPT | Mod: 25

## 2018-11-16 PROCEDURE — 82272 OCCULT BLD FECES 1-3 TESTS: CPT

## 2018-11-16 RX ORDER — FAMOTIDINE 20 MG/1
20 TABLET, FILM COATED ORAL
Status: DISCONTINUED | OUTPATIENT
Start: 2018-11-16 | End: 2018-11-16

## 2018-11-16 RX ORDER — ONDANSETRON 4 MG/1
4 TABLET, ORALLY DISINTEGRATING ORAL
Status: COMPLETED | OUTPATIENT
Start: 2018-11-16 | End: 2018-11-16

## 2018-11-16 RX ORDER — PANTOPRAZOLE SODIUM 40 MG/1
40 TABLET, DELAYED RELEASE ORAL
Status: COMPLETED | OUTPATIENT
Start: 2018-11-16 | End: 2018-11-16

## 2018-11-16 RX ORDER — ONDANSETRON 4 MG/1
4 TABLET, FILM COATED ORAL EVERY 6 HOURS PRN
Qty: 12 TABLET | Refills: 0 | Status: SHIPPED | OUTPATIENT
Start: 2018-11-16 | End: 2020-06-01

## 2018-11-16 RX ADMIN — IOHEXOL 75 ML: 350 INJECTION, SOLUTION INTRAVENOUS at 03:11

## 2018-11-16 RX ADMIN — SODIUM CHLORIDE 1000 ML: 0.9 INJECTION, SOLUTION INTRAVENOUS at 02:11

## 2018-11-16 RX ADMIN — PANTOPRAZOLE SODIUM 40 MG: 40 TABLET, DELAYED RELEASE ORAL at 02:11

## 2018-11-16 RX ADMIN — ONDANSETRON 4 MG: 4 TABLET, ORALLY DISINTEGRATING ORAL at 02:11

## 2018-11-16 NOTE — ED PROVIDER NOTES
Encounter Date: 11/16/2018    SCRIBE #1 NOTE: I, Jacksonjannette Cerna , am scribing for, and in the presence of, Rogelio Bill MD. the APC attestation.       History     Chief Complaint   Patient presents with    Fatigue    Emesis     x1 day     The patient who has a past medical history significant for pancreatitis and frequent alcohol use (3-4 days per week) presents to the ER for an emergent evaluation due to acute periumbilical pain, decreased appetite, nausea, vomiting, and diarrhea. He states that his symptoms began yesterday. He states that the degree of pain is mild to moderate. He states that the pain comes and goes. He states that his vomitus was red colored and watery. He states that his diarrhea is dark and watery. He reports 3-4 episodes of each total. He denies any mitigating or exacerbating factors. He denies any alcohol consumption in the past 2 days. He denies any history of GI bleeding in the past. He denies frequent NSAID use. He denies any additional symptoms. He denies any pre-arrival treatment.           Review of patient's allergies indicates:  No Known Allergies  Past Medical History:   Diagnosis Date    Alcohol consumption of more than four drinks per week     Asthma     Back muscle spasm     Pancreatitis      History reviewed. No pertinent surgical history.  Family History   Problem Relation Age of Onset    Diabetes Mother      Social History     Tobacco Use    Smoking status: Current Some Day Smoker     Packs/day: 0.25     Types: Cigarettes    Smokeless tobacco: Never Used    Tobacco comment: Approximately 1 pack every 2-3 days   Substance Use Topics    Alcohol use: Yes     Alcohol/week: 0.6 - 1.2 oz     Types: 1 - 2 Standard drinks or equivalent per week     Frequency: 4 or more times a week     Comment: 3-4 days per week ; generally liquor    Drug use: Yes     Types: Marijuana     Comment: Daily     Review of Systems   Constitutional: Negative for activity change, chills and  fever.   HENT: Negative for nosebleeds.    Respiratory: Negative for cough and shortness of breath.    Cardiovascular: Negative for chest pain.   Gastrointestinal: Positive for abdominal pain, diarrhea, nausea and vomiting. Negative for abdominal distention.   Genitourinary: Negative for decreased urine volume, dysuria, flank pain, frequency and hematuria.   Musculoskeletal: Negative for arthralgias, back pain and myalgias.   Skin: Negative for rash.   Neurological: Negative for dizziness, syncope, weakness, light-headedness, numbness and headaches.   Psychiatric/Behavioral: Negative for confusion.       Physical Exam     Initial Vitals [11/16/18 1305]   BP Pulse Resp Temp SpO2   122/73 99 16 99.2 °F (37.3 °C) 100 %      MAP       --         Physical Exam    Nursing note and vitals reviewed.  Constitutional: He appears well-developed and well-nourished. He is not diaphoretic.   Alert and ambulatory. Non-toxic appearance. Accompanied by his wife or girlfriend.    HENT:   Head: Normocephalic.   Mouth/Throat: Oropharynx is clear and moist.   Eyes: Conjunctivae are normal. No scleral icterus.   Neck: Normal range of motion. Neck supple.   Cardiovascular: Normal rate, regular rhythm and intact distal pulses.   Pulmonary/Chest: Breath sounds normal. No respiratory distress.   Abdominal: Soft. He exhibits no distension. There is tenderness. There is no rebound and no guarding.   Diffuse periumbilical tenderness to palpation - moderate. No focal point tenderness. Negative Mtz's sign. Negative McBurney's point.    Genitourinary: Rectal exam shows guaiac negative stool. Guaiac negative stool.   Genitourinary Comments: CRYSTAL: No abscess or hemorrhoid. Normal tone. No abnormal pain to exam. No stool in rectal vault. No melena or hematochezia appreciated. Negative Guiac.    Musculoskeletal: Normal range of motion.   Neurological: He is alert and oriented to person, place, and time. He has normal strength. No sensory deficit.    Normal speech. Normal gait. AAO x 3. No focal deficit.    Skin: Skin is warm and dry. No rash noted.   No jaundice. No diaphoresis.    Psychiatric: He has a normal mood and affect.         ED Course   Procedures  Labs Reviewed   CBC W/ AUTO DIFFERENTIAL - Abnormal; Notable for the following components:       Result Value    WBC 14.09 (*)     RBC 4.34 (*)      (*)     MCH 34.3 (*)     MPV 8.8 (*)     Gran # (ANC) 12.5 (*)     Immature Grans (Abs) 0.05 (*)     Lymph # 0.8 (*)     Gran% 88.8 (*)     Lymph% 6.0 (*)     All other components within normal limits   COMPREHENSIVE METABOLIC PANEL   LIPASE   PROTIME-INR   OCCULT BLOOD X 1, STOOL     Results for orders placed or performed during the hospital encounter of 11/16/18   CBC auto differential   Result Value Ref Range    WBC 14.09 (H) 3.90 - 12.70 K/uL    RBC 4.34 (L) 4.60 - 6.20 M/uL    Hemoglobin 14.9 14.0 - 18.0 g/dL    Hematocrit 44.1 40.0 - 54.0 %     (H) 82 - 98 fL    MCH 34.3 (H) 27.0 - 31.0 pg    MCHC 33.8 32.0 - 36.0 g/dL    RDW 12.5 11.5 - 14.5 %    Platelets 212 150 - 350 K/uL    MPV 8.8 (L) 9.2 - 12.9 fL    Immature Granulocytes 0.4 0.0 - 0.5 %    Gran # (ANC) 12.5 (H) 1.8 - 7.7 K/uL    Immature Grans (Abs) 0.05 (H) 0.00 - 0.04 K/uL    Lymph # 0.8 (L) 1.0 - 4.8 K/uL    Mono # 0.6 0.3 - 1.0 K/uL    Eos # 0.0 0.0 - 0.5 K/uL    Baso # 0.03 0.00 - 0.20 K/uL    nRBC 0 0 /100 WBC    Gran% 88.8 (H) 38.0 - 73.0 %    Lymph% 6.0 (L) 18.0 - 48.0 %    Mono% 4.3 4.0 - 15.0 %    Eosinophil% 0.3 0.0 - 8.0 %    Basophil% 0.2 0.0 - 1.9 %    Differential Method Automated    Comprehensive metabolic panel   Result Value Ref Range    Sodium 139 136 - 145 mmol/L    Potassium 4.2 3.5 - 5.1 mmol/L    Chloride 103 95 - 110 mmol/L    CO2 28 23 - 29 mmol/L    Glucose 91 70 - 110 mg/dL    BUN, Bld 11 6 - 20 mg/dL    Creatinine 0.9 0.5 - 1.4 mg/dL    Calcium 10.0 8.7 - 10.5 mg/dL    Total Protein 8.0 6.0 - 8.4 g/dL    Albumin 4.4 3.5 - 5.2 g/dL    Total Bilirubin 0.7  0.1 - 1.0 mg/dL    Alkaline Phosphatase 59 55 - 135 U/L    AST 24 10 - 40 U/L    ALT 18 10 - 44 U/L    Anion Gap 8 8 - 16 mmol/L    eGFR if African American >60.0 >60 mL/min/1.73 m^2    eGFR if non African American >60.0 >60 mL/min/1.73 m^2   Lipase   Result Value Ref Range    Lipase 24 4 - 60 U/L   Protime-INR   Result Value Ref Range    Prothrombin Time 10.6 9.0 - 12.5 sec    INR 1.0 0.8 - 1.2     Results for orders placed or performed during the hospital encounter of 11/16/18   CBC auto differential   Result Value Ref Range    WBC 14.09 (H) 3.90 - 12.70 K/uL    RBC 4.34 (L) 4.60 - 6.20 M/uL    Hemoglobin 14.9 14.0 - 18.0 g/dL    Hematocrit 44.1 40.0 - 54.0 %     (H) 82 - 98 fL    MCH 34.3 (H) 27.0 - 31.0 pg    MCHC 33.8 32.0 - 36.0 g/dL    RDW 12.5 11.5 - 14.5 %    Platelets 212 150 - 350 K/uL    MPV 8.8 (L) 9.2 - 12.9 fL    Immature Granulocytes 0.4 0.0 - 0.5 %    Gran # (ANC) 12.5 (H) 1.8 - 7.7 K/uL    Immature Grans (Abs) 0.05 (H) 0.00 - 0.04 K/uL    Lymph # 0.8 (L) 1.0 - 4.8 K/uL    Mono # 0.6 0.3 - 1.0 K/uL    Eos # 0.0 0.0 - 0.5 K/uL    Baso # 0.03 0.00 - 0.20 K/uL    nRBC 0 0 /100 WBC    Gran% 88.8 (H) 38.0 - 73.0 %    Lymph% 6.0 (L) 18.0 - 48.0 %    Mono% 4.3 4.0 - 15.0 %    Eosinophil% 0.3 0.0 - 8.0 %    Basophil% 0.2 0.0 - 1.9 %    Differential Method Automated    Comprehensive metabolic panel   Result Value Ref Range    Sodium 139 136 - 145 mmol/L    Potassium 4.2 3.5 - 5.1 mmol/L    Chloride 103 95 - 110 mmol/L    CO2 28 23 - 29 mmol/L    Glucose 91 70 - 110 mg/dL    BUN, Bld 11 6 - 20 mg/dL    Creatinine 0.9 0.5 - 1.4 mg/dL    Calcium 10.0 8.7 - 10.5 mg/dL    Total Protein 8.0 6.0 - 8.4 g/dL    Albumin 4.4 3.5 - 5.2 g/dL    Total Bilirubin 0.7 0.1 - 1.0 mg/dL    Alkaline Phosphatase 59 55 - 135 U/L    AST 24 10 - 40 U/L    ALT 18 10 - 44 U/L    Anion Gap 8 8 - 16 mmol/L    eGFR if African American >60.0 >60 mL/min/1.73 m^2    eGFR if non African American >60.0 >60 mL/min/1.73 m^2   Lipase    Result Value Ref Range    Lipase 24 4 - 60 U/L   Protime-INR   Result Value Ref Range    Prothrombin Time 10.6 9.0 - 12.5 sec    INR 1.0 0.8 - 1.2              Imaging Results          CT Abdomen Pelvis With Contrast (Final result)  Result time 11/16/18 15:59:13    Final result by Chandler Calderón MD (11/16/18 15:59:13)                 Impression:      1. Urinary bladder wall thickening, likely related to nondistention however correlation with urinalysis recommended.  2. Fluid-filled mildly prominent small bowel loops with fluid-filled non dilated distal small bowel loops.  There is a moderate amount of fluid in the stomach, the fluid in the small bowel may reflect migration of the same, however early changes of enteritis are a consideration.  No bowel wall thickening or inflammatory changes.  Correlation is recommended.  3. Several additional findings above.      Electronically signed by: Chandler Calderón MD  Date:    11/16/2018  Time:    15:59             Narrative:    EXAMINATION:  CT ABDOMEN PELVIS WITH CONTRAST    CLINICAL HISTORY:  Abdominal pain, unspecified;    TECHNIQUE:  Low dose axial images, sagittal and coronal reformations were obtained from the lung bases to the pubic symphysis following the IV administration of 75 mL of Omnipaque 350 .  Oral contrast was not given.    COMPARISON:  None.    FINDINGS:  Images of the lower thorax are unremarkable.    There is a punctate focus of hypoattenuation within the left hepatic dome versus artifact, too small for characterization.  Additional punctate focus of hypoattenuation is noted within the lateral aspect of the right hepatic lobe, also too small for characterization.  The spleen, pancreas, gallbladder and adrenal glands are grossly unremarkable.  The stomach is mildly distended with ingested content.  The portal vein, splenic vein, SMV, celiac axis and SMA all are patent.  There is no biliary dilation or ascites.  No significant abdominal  "lymphadenopathy.    The kidneys enhance symmetrically without hydronephrosis or nephrolithiasis.  The bilateral ureters are grossly unremarkable along their visualized courses without definite calculi seen.  The urinary bladder is decompressed, likely accounting for mild wall thickening although correlation with urinalysis as warranted.  The prostate is not enlarged.    The large bowel is for the most part decompressed noting liquid stool in the rectum.  There are a few scattered colonic diverticula without convincing inflammation.  The terminal ileum and appendix are unremarkable.  There are scattered fluid-filled small bowel loops throughout the abdomen and pelvis, noting proximal small bowel loops are upper limit of normal in caliber and fluid-filled.  No wall thickening or surrounding inflammation.    No focal osseous destructive process.  No significant inguinal lymphadenopathy.                              Vitals:    11/16/18 1305 11/16/18 1535   BP: 122/73 115/60   BP Location: Right arm Right arm   Patient Position: Sitting Sitting   Pulse: 99 63   Resp: 16 18   Temp: 99.2 °F (37.3 °C) 99.8 °F (37.7 °C)   TempSrc: Oral Oral   SpO2: 100% 98%   Weight: 70.8 kg (156 lb)    Height: 5' 9" (1.753 m)           Medical Decision Making:   History:   Old Medical Records: I decided to obtain old medical records.  Initial Assessment:   Pt here c/o abdominal pain, decreased appetite, nausea, vomiting, and diarrhea. Describes red emesis and dark diarrhea. Denies coffee ground emesis. Hx of pancreatitis and frequent alcohol use in the past.   Differential Diagnosis:   Pancreatitis, Gastritis, PUD, Enteritis, Gastroenteritis, colitis, Diverticulitis, Mesenteric adenitis, Appendicitis, Hepatitis, Upper GI bleed, Esophageal varices, Cholelithiasis, Cholecystitis, Cholangitis, Esophagitis, Duodenitis, Bowel perforation, peritonitis, Electrolyte derangement, Anemia, etc.   Clinical Tests:   Lab Tests: Ordered and " Reviewed  Radiological Study: Ordered and Reviewed  ED Management:  Guiac stool negative   H & H stable   Pancreatic enzymes negative   Pt reports feeling better after treatment in the ER and is requesting to go home   Advised close follow up with primary care   Advised prompt return to the ER if worse in any way   Other:   I have discussed this case with another health care provider.       <> Summary of the Discussion: I discussed the case in detail with Dr Bill, ER attending physician.             Scribe Attestation:   Scribe #1: I performed the above scribed service and the documentation accurately describes the services I performed. I attest to the accuracy of the note.    Attending Attestation:     Physician Attestation Statement for NP/PA:   I discussed this assessment and plan of this patient with the NP/PA, but I did not personally examine the patient. The face to face encounter was performed by the NP/PA.                     Clinical Impression:   The primary encounter diagnosis was Acute abdominal pain. Diagnoses of Leukocytosis, unspecified type and Acute gastroenteritis were also pertinent to this visit.      Disposition:   Disposition: Discharged  Condition: Stable                             Jayjay Zimmerman PA-C  11/16/18 8923

## 2018-11-16 NOTE — ED NOTES
"The patient came to the ER today with complaints of hematemesis (pt reports pure blood that was Stephen Aid color), one episode of hematemesis with 5 episodes of vomiting total. + nausea. + dizzy. + epigastric pain. Vomiting began today. Yesterday pt states he "just did not feel well " and had a decreased appetite.   "

## 2018-11-21 ENCOUNTER — HOSPITAL ENCOUNTER (EMERGENCY)
Facility: HOSPITAL | Age: 33
Discharge: HOME | End: 2018-11-21
Attending: EMERGENCY MEDICINE

## 2018-11-21 VITALS
HEIGHT: 69 IN | TEMPERATURE: 98 F | HEART RATE: 86 BPM | SYSTOLIC BLOOD PRESSURE: 128 MMHG | RESPIRATION RATE: 16 BRPM | DIASTOLIC BLOOD PRESSURE: 68 MMHG | BODY MASS INDEX: 23.99 KG/M2 | OXYGEN SATURATION: 98 % | WEIGHT: 162 LBS

## 2018-11-21 DIAGNOSIS — Z20.5 EXPOSURE TO HEPATITIS C: Primary | ICD-10-CM

## 2018-11-21 PROCEDURE — 99283 EMERGENCY DEPT VISIT LOW MDM: CPT

## 2018-11-21 PROCEDURE — 99281 EMR DPT VST MAYX REQ PHY/QHP: CPT | Mod: ,,, | Performed by: EMERGENCY MEDICINE

## 2018-11-21 NOTE — ED NOTES
Pt identifiers Orion Skaggs were checked and correct  LOC: The patient is awake, alert, aware of environment with an appropriate affect. Oriented x3, speaking appropriately  APPEARANCE: Pt resting comfortably, in no acute distress, pt is clean and well groomed, clothing properly fastened  SKIN: Skin warm, dry and intact, normal skin turgor, moist mucus membranes  RESPIRATORY: Airway is open and patent, respirations are spontaneous, even and unlabored, normal effort and rate  CARDIAC: Normal rate and rhythm, no peripheral edema noted, capillary refill < 3 seconds, bilateral radial pulses 2+  ABDOMEN: Soft, non tender, non distended. Bowel sounds present x 4 quadrants.    NEUROLOGIC: PERRL, facial expression is symmetrical, patient moving all extremities spontaneously, normal sensation in all extremities when touched with a finger.  Follows all commands appropriately  MUSCULOSKELETAL: No obvious deformities.

## 2018-11-21 NOTE — ED NOTES
Pt presented to the ED via pov. Pt c/o exposure to STD. Pt states he was exposed to Hep C. Pt was told to come to the ER for further evaluation.

## 2018-11-21 NOTE — DISCHARGE INSTRUCTIONS
Our goal in the emergency department is to always give you outstanding care and exceptional service. You may receive a survey by mail or e-mail in the next week regarding your experience in our ED. We would greatly appreciate your completing and returning the survey. Your feedback provides us with a way to recognize our staff who give very good care and it helps us learn how to improve when your experience was below our aspiration of excellence.     Please [facility] with [facility] to get tested for hepatitis C and any other STIs.

## 2018-11-21 NOTE — ED PROVIDER NOTES
"Encounter Date: 11/21/2018       History     Chief Complaint   Patient presents with    Exposure to STD     Patient presents to ER via POV. Patient states, "I was recently told that my fiance has hepatitis C, and I want to get checked".      Patient is a 33-year-old male with medical history of alcohol abuse, asthma, pancreatitis presenting to the ED for hepatitis C checked.  Patient states his fiancee just notified him that he she is positive for hepatitis-C and was advised to get checked.  Patient denies any complaints on exam.           Review of patient's allergies indicates:  No Known Allergies  Past Medical History:   Diagnosis Date    Alcohol consumption of more than four drinks per week     Asthma     Back muscle spasm     Pancreatitis      No past surgical history on file.  Family History   Problem Relation Age of Onset    Diabetes Mother      Social History     Tobacco Use    Smoking status: Current Some Day Smoker     Packs/day: 0.25     Types: Cigarettes    Smokeless tobacco: Never Used    Tobacco comment: Approximately 1 pack every 2-3 days   Substance Use Topics    Alcohol use: Yes     Alcohol/week: 0.6 - 1.2 oz     Types: 1 - 2 Standard drinks or equivalent per week     Frequency: 4 or more times a week     Comment: 3-4 days per week ; generally liquor    Drug use: Yes     Types: Marijuana     Comment: Daily     Review of Systems   Constitutional: Negative for activity change, appetite change, chills and fever.   Gastrointestinal: Negative for abdominal pain, constipation, diarrhea, nausea and vomiting.   Endocrine: Negative for polydipsia, polyphagia and polyuria.   Genitourinary: Negative for difficulty urinating.   Musculoskeletal: Negative for arthralgias and myalgias.   Neurological: Negative for dizziness, syncope, weakness, numbness and headaches.       Physical Exam     Initial Vitals [11/21/18 1242]   BP Pulse Resp Temp SpO2   128/68 86 16 98.3 °F (36.8 °C) 98 %      MAP       --   "       Physical Exam    Nursing note and vitals reviewed.  Constitutional: Vital signs are normal. He appears well-developed and well-nourished. He is not diaphoretic. He is cooperative. He does not have a sickly appearance. He does not appear ill. No distress.   HENT:   Head: Normocephalic and atraumatic.   Eyes: Pupils are equal, round, and reactive to light.   Neck: Normal range of motion and phonation normal. Neck supple.   Cardiovascular: Regular rhythm and normal heart sounds.   Pulmonary/Chest: Effort normal and breath sounds normal.   Abdominal: Soft. Normal appearance and bowel sounds are normal. He exhibits no distension. There is no tenderness. There is no rigidity, no rebound and no guarding.   Musculoskeletal: Normal range of motion.   Neurological: He is alert and oriented to person, place, and time. He has normal strength. No sensory deficit. GCS eye subscore is 4. GCS verbal subscore is 5. GCS motor subscore is 6.   Skin: Skin is warm, dry and intact. Capillary refill takes less than 2 seconds.   Psychiatric: He has a normal mood and affect. His behavior is normal. Judgment and thought content normal.         ED Course   Procedures  Labs Reviewed - No data to display       Imaging Results    None                APC / Resident Notes:   Emergent evaluation of a 34 yo male patient presenting to the ER requesting a hepatitis C check.  Pt states he was notified that his fiance tested positive and is requesting bloodwork.  Pt denies any complaints on exam.  On exam patient A&O x3.  Abdomen soft and nontender.  Breath sounds clear bilaterally.  Patient advised to [facility] with [facility] or [facility] for hepatitis screening.  Patient denies any risk of any other STIs.  Patient given resource she for STD clinics in the area as well as [facility] information and free clinics for further evaluation.  Patient verbalized understanding of plan of care.  I discussed the care of this patient  with my Supervising Physician.      Patient is hemodynamically stable, vital signs are normal. Discharge instructions given. Return to ED precautions discussed. Follow up as directed. Pt verbalized understanding of this plan. Pt is stable for discharge.                    Clinical Impression:   The encounter diagnosis was Exposure to hepatitis C.      Disposition:   Disposition: Discharged  Condition: Stable                        Deena Wilkes NP  11/21/18 8540

## 2019-09-22 ENCOUNTER — HOSPITAL ENCOUNTER (EMERGENCY)
Facility: HOSPITAL | Age: 34
Discharge: HOME | End: 2019-09-22
Attending: EMERGENCY MEDICINE

## 2019-09-22 VITALS
DIASTOLIC BLOOD PRESSURE: 56 MMHG | HEART RATE: 71 BPM | OXYGEN SATURATION: 98 % | HEIGHT: 70 IN | TEMPERATURE: 99 F | BODY MASS INDEX: 23.58 KG/M2 | SYSTOLIC BLOOD PRESSURE: 128 MMHG | RESPIRATION RATE: 16 BRPM

## 2019-09-22 DIAGNOSIS — R07.81 RIB PAIN: ICD-10-CM

## 2019-09-22 DIAGNOSIS — R10.9 ABDOMINAL PAIN, UNSPECIFIED ABDOMINAL LOCATION: Primary | ICD-10-CM

## 2019-09-22 LAB
ALBUMIN SERPL BCP-MCNC: 4.2 G/DL (ref 3.5–5.2)
ALP SERPL-CCNC: 64 U/L (ref 55–135)
ALT SERPL W/O P-5'-P-CCNC: 17 U/L (ref 10–44)
ANION GAP SERPL CALC-SCNC: 10 MMOL/L (ref 8–16)
AST SERPL-CCNC: 22 U/L (ref 10–40)
BASOPHILS # BLD AUTO: 0.05 K/UL (ref 0–0.2)
BASOPHILS NFR BLD: 0.6 % (ref 0–1.9)
BILIRUB SERPL-MCNC: 0.4 MG/DL (ref 0.1–1)
BUN SERPL-MCNC: 12 MG/DL (ref 6–20)
CALCIUM SERPL-MCNC: 9.1 MG/DL (ref 8.7–10.5)
CHLORIDE SERPL-SCNC: 103 MMOL/L (ref 95–110)
CO2 SERPL-SCNC: 28 MMOL/L (ref 23–29)
CREAT SERPL-MCNC: 1 MG/DL (ref 0.5–1.4)
DIFFERENTIAL METHOD: ABNORMAL
EOSINOPHIL # BLD AUTO: 0.2 K/UL (ref 0–0.5)
EOSINOPHIL NFR BLD: 2.3 % (ref 0–8)
ERYTHROCYTE [DISTWIDTH] IN BLOOD BY AUTOMATED COUNT: 12.7 % (ref 11.5–14.5)
EST. GFR  (AFRICAN AMERICAN): >60 ML/MIN/1.73 M^2
EST. GFR  (NON AFRICAN AMERICAN): >60 ML/MIN/1.73 M^2
GLUCOSE SERPL-MCNC: 95 MG/DL (ref 70–110)
HCT VFR BLD AUTO: 42.8 % (ref 40–54)
HGB BLD-MCNC: 13.8 G/DL (ref 14–18)
IMM GRANULOCYTES # BLD AUTO: 0.02 K/UL (ref 0–0.04)
IMM GRANULOCYTES NFR BLD AUTO: 0.2 % (ref 0–0.5)
LIPASE SERPL-CCNC: 49 U/L (ref 4–60)
LYMPHOCYTES # BLD AUTO: 3.8 K/UL (ref 1–4.8)
LYMPHOCYTES NFR BLD: 42.1 % (ref 18–48)
MCH RBC QN AUTO: 33.6 PG (ref 27–31)
MCHC RBC AUTO-ENTMCNC: 32.2 G/DL (ref 32–36)
MCV RBC AUTO: 104 FL (ref 82–98)
MONOCYTES # BLD AUTO: 1 K/UL (ref 0.3–1)
MONOCYTES NFR BLD: 10.7 % (ref 4–15)
NEUTROPHILS # BLD AUTO: 3.9 K/UL (ref 1.8–7.7)
NEUTROPHILS NFR BLD: 44.1 % (ref 38–73)
NRBC BLD-RTO: 0 /100 WBC
PLATELET # BLD AUTO: 211 K/UL (ref 150–350)
PMV BLD AUTO: 8.6 FL (ref 9.2–12.9)
POTASSIUM SERPL-SCNC: 4 MMOL/L (ref 3.5–5.1)
PROT SERPL-MCNC: 7.5 G/DL (ref 6–8.4)
RBC # BLD AUTO: 4.11 M/UL (ref 4.6–6.2)
SODIUM SERPL-SCNC: 141 MMOL/L (ref 136–145)
WBC # BLD AUTO: 8.95 K/UL (ref 3.9–12.7)

## 2019-09-22 PROCEDURE — 85025 COMPLETE CBC W/AUTO DIFF WBC: CPT

## 2019-09-22 PROCEDURE — 99284 PR EMERGENCY DEPT VISIT,LEVEL IV: ICD-10-PCS | Mod: ,,, | Performed by: EMERGENCY MEDICINE

## 2019-09-22 PROCEDURE — 99285 EMERGENCY DEPT VISIT HI MDM: CPT | Mod: 25

## 2019-09-22 PROCEDURE — 83690 ASSAY OF LIPASE: CPT

## 2019-09-22 PROCEDURE — 25500020 PHARM REV CODE 255: Performed by: EMERGENCY MEDICINE

## 2019-09-22 PROCEDURE — 80053 COMPREHEN METABOLIC PANEL: CPT

## 2019-09-22 PROCEDURE — 99284 EMERGENCY DEPT VISIT MOD MDM: CPT | Mod: ,,, | Performed by: EMERGENCY MEDICINE

## 2019-09-22 RX ORDER — KETOROLAC TROMETHAMINE 30 MG/ML
15 INJECTION, SOLUTION INTRAMUSCULAR; INTRAVENOUS
Status: DISCONTINUED | OUTPATIENT
Start: 2019-09-22 | End: 2019-09-22 | Stop reason: HOSPADM

## 2019-09-22 RX ORDER — MORPHINE SULFATE 4 MG/ML
4 INJECTION, SOLUTION INTRAMUSCULAR; INTRAVENOUS
Status: DISCONTINUED | OUTPATIENT
Start: 2019-09-22 | End: 2019-09-22 | Stop reason: HOSPADM

## 2019-09-22 RX ADMIN — IOHEXOL 75 ML: 350 INJECTION, SOLUTION INTRAVENOUS at 03:09

## 2019-09-22 NOTE — ED PROVIDER NOTES
Encounter Date: 9/22/2019       History     Chief Complaint   Patient presents with    Rib Pain     c/o pain to R lateral torso at lower ribs. states only hurts when he coughs or sneezing      HPI   34M presenting for R side pain x 3 weeks. It is a rubbing sensation most severe with breathing and with rotating to the left. Reports it was preceded by nausea and vomiting for 1 week that he thought was due to a GI virus. It hurts to touch his R side. He denies any associated fevers, chills, nausea, vomiting, cough, sputum production, chest pain, burning with urination, or penile discharge. He denies a history of previous surgeries. Remote history of childhood asthma that no longer requires medications. Denies any medical allergies.     Review of patient's allergies indicates:  No Known Allergies  Past Medical History:   Diagnosis Date    Alcohol consumption of more than four drinks per week     Asthma     Back muscle spasm     Pancreatitis      No past surgical history on file.  Family History   Problem Relation Age of Onset    Diabetes Mother      Social History     Tobacco Use    Smoking status: Current Some Day Smoker     Packs/day: 0.25     Types: Cigarettes    Smokeless tobacco: Never Used    Tobacco comment: Approximately 1 pack every 2-3 days   Substance Use Topics    Alcohol use: Yes     Alcohol/week: 1.0 - 2.0 standard drinks     Types: 1 - 2 Standard drinks or equivalent per week     Frequency: 4 or more times a week     Comment: 3-4 days per week ; generally liquor    Drug use: Yes     Types: Marijuana     Comment: Daily     Review of Systems   Constitutional: Negative for fatigue and fever.   HENT: Negative for sneezing and sore throat.    Respiratory: Negative for cough and shortness of breath.    Cardiovascular: Positive for chest pain. Negative for palpitations.   Gastrointestinal: Negative for diarrhea and nausea.   Genitourinary: Negative for dysuria and flank pain.   Musculoskeletal:  Negative for arthralgias and back pain.   Skin: Negative for pallor and rash.   Neurological: Negative for tremors and weakness.   Hematological: Does not bruise/bleed easily.   Psychiatric/Behavioral: Negative for agitation and behavioral problems.       Physical Exam     Initial Vitals [09/22/19 0052]   BP Pulse Resp Temp SpO2   (!) 128/56 71 16 98.6 °F (37 °C) 98 %      MAP       --         Physical Exam    Nursing note and vitals reviewed.  Constitutional: He appears well-developed and well-nourished.   HENT:   Head: Normocephalic and atraumatic.   Right Ear: External ear normal.   Left Ear: External ear normal.   Mouth/Throat: Oropharynx is clear and moist.   Eyes: Conjunctivae and EOM are normal. Pupils are equal, round, and reactive to light.   Neck: Normal range of motion. Neck supple.   Cardiovascular: Normal rate and intact distal pulses.   Pulmonary/Chest: Breath sounds normal. No respiratory distress.   Abdominal: Soft. Bowel sounds are normal. He exhibits distension.   Genitourinary: Penis normal. No discharge found.   Musculoskeletal: Normal range of motion. He exhibits no edema or tenderness.   Neurological: He is alert and oriented to person, place, and time.   Skin: Skin is warm and dry. Capillary refill takes less than 2 seconds.   Psychiatric: He has a normal mood and affect. Thought content normal.         ED Course   Procedures  Labs Reviewed   CBC W/ AUTO DIFFERENTIAL - Abnormal; Notable for the following components:       Result Value    RBC 4.11 (*)     Hemoglobin 13.8 (*)     Mean Corpuscular Volume 104 (*)     Mean Corpuscular Hemoglobin 33.6 (*)     MPV 8.6 (*)     All other components within normal limits   COMPREHENSIVE METABOLIC PANEL   LIPASE          Imaging Results    None          Medical Decision Making:   Differential Diagnosis:   Pancreatitis, hepatobiliary obstruction, perforated viscus, appendicitis, musculoskeletal contusion, empyema  Clinical Tests:   Lab Tests:  Ordered  Radiological Study: Ordered  ED Management:  Pt evaluated during epic down time and although his history described 3 weeks of this pain preceded by nausea/vomiting, his exam was clinically concerning for appy with guarding on RLQ exam upon laying him supine.   Plan:   - obtained labs & imaging stat  - pain control stat   - re-assessment  Jerome Spears MD  PGY-4 [facility]  09/22/2019 3:42 AM    Update:   - pt refused pain medications  - No leukocytosis or transaminitis noted, lipase within normal limits  - CXR with no free air and no consolidations or pleural effusions  - POCUS of the RUQ with no e/o cholelithiasis or biliary sludge  - CT abdomen pelvis with no e/o appendicitis or other acute intra-abdominal pathology; pt updated about hepatic cyst which has been noted on imaging in the past  - Will discharge to home with return precautions and information for local clinics for establishment of care with PCP  Jerome Spears MD  PGY-4 [facility]  09/22/2019 4:39 AM                          Clinical Impression:       ICD-10-CM ICD-9-CM   1. Abdominal pain, unspecified abdominal location R10.9 789.00   2. Rib pain R07.81 786.50                                Jerome Spears MD  Resident  09/22/19 1913

## 2020-01-01 ENCOUNTER — HOSPITAL ENCOUNTER (EMERGENCY)
Facility: HOSPITAL | Age: 35
Discharge: HOME | End: 2020-01-01
Attending: EMERGENCY MEDICINE

## 2020-01-01 VITALS
HEART RATE: 62 BPM | SYSTOLIC BLOOD PRESSURE: 129 MMHG | BODY MASS INDEX: 23.99 KG/M2 | DIASTOLIC BLOOD PRESSURE: 57 MMHG | HEIGHT: 69 IN | TEMPERATURE: 99 F | WEIGHT: 162 LBS | RESPIRATION RATE: 12 BRPM | OXYGEN SATURATION: 95 %

## 2020-01-01 DIAGNOSIS — R41.0 CONFUSION: ICD-10-CM

## 2020-01-01 DIAGNOSIS — R07.9 CHEST PAIN: ICD-10-CM

## 2020-01-01 DIAGNOSIS — F19.10 POLYSUBSTANCE ABUSE: Primary | ICD-10-CM

## 2020-01-01 LAB
ALBUMIN SERPL BCP-MCNC: 3.9 G/DL (ref 3.5–5.2)
ALP SERPL-CCNC: 67 U/L (ref 55–135)
ALT SERPL W/O P-5'-P-CCNC: 13 U/L (ref 10–44)
AMPHET+METHAMPHET UR QL: NORMAL
ANION GAP SERPL CALC-SCNC: 12 MMOL/L (ref 8–16)
AST SERPL-CCNC: 20 U/L (ref 10–40)
BARBITURATES UR QL SCN>200 NG/ML: NEGATIVE
BASOPHILS # BLD AUTO: 0.03 K/UL (ref 0–0.2)
BASOPHILS NFR BLD: 0.2 % (ref 0–1.9)
BENZODIAZ UR QL SCN>200 NG/ML: NEGATIVE
BILIRUB SERPL-MCNC: 0.6 MG/DL (ref 0.1–1)
BILIRUB UR QL STRIP: NEGATIVE
BNP SERPL-MCNC: <10 PG/ML (ref 0–99)
BUN SERPL-MCNC: 16 MG/DL (ref 6–20)
BZE UR QL SCN: NORMAL
CALCIUM SERPL-MCNC: 9.1 MG/DL (ref 8.7–10.5)
CANNABINOIDS UR QL SCN: NORMAL
CHLORIDE SERPL-SCNC: 101 MMOL/L (ref 95–110)
CLARITY UR REFRACT.AUTO: CLEAR
CO2 SERPL-SCNC: 26 MMOL/L (ref 23–29)
COLOR UR AUTO: YELLOW
CREAT SERPL-MCNC: 1.2 MG/DL (ref 0.5–1.4)
CREAT UR-MCNC: 240 MG/DL (ref 23–375)
CTP QC/QA: YES
DIFFERENTIAL METHOD: ABNORMAL
EOSINOPHIL # BLD AUTO: 0 K/UL (ref 0–0.5)
EOSINOPHIL NFR BLD: 0.2 % (ref 0–8)
ERYTHROCYTE [DISTWIDTH] IN BLOOD BY AUTOMATED COUNT: 12.8 % (ref 11.5–14.5)
EST. GFR  (AFRICAN AMERICAN): >60 ML/MIN/1.73 M^2
EST. GFR  (NON AFRICAN AMERICAN): >60 ML/MIN/1.73 M^2
ETHANOL SERPL-MCNC: 15 MG/DL
GLUCOSE SERPL-MCNC: 111 MG/DL (ref 70–110)
GLUCOSE UR QL STRIP: NEGATIVE
HCT VFR BLD AUTO: 41.2 % (ref 40–54)
HGB BLD-MCNC: 13.8 G/DL (ref 14–18)
HGB UR QL STRIP: ABNORMAL
IMM GRANULOCYTES # BLD AUTO: 0.09 K/UL (ref 0–0.04)
IMM GRANULOCYTES NFR BLD AUTO: 0.5 % (ref 0–0.5)
KETONES UR QL STRIP: NEGATIVE
LEUKOCYTE ESTERASE UR QL STRIP: NEGATIVE
LIPASE SERPL-CCNC: 40 U/L (ref 4–60)
LYMPHOCYTES # BLD AUTO: 2.9 K/UL (ref 1–4.8)
LYMPHOCYTES NFR BLD: 16 % (ref 18–48)
MCH RBC QN AUTO: 34.2 PG (ref 27–31)
MCHC RBC AUTO-ENTMCNC: 33.5 G/DL (ref 32–36)
MCV RBC AUTO: 102 FL (ref 82–98)
METHADONE UR QL SCN>300 NG/ML: NEGATIVE
MICROSCOPIC COMMENT: NORMAL
MONOCYTES # BLD AUTO: 1.4 K/UL (ref 0.3–1)
MONOCYTES NFR BLD: 7.9 % (ref 4–15)
NEUTROPHILS # BLD AUTO: 13.7 K/UL (ref 1.8–7.7)
NEUTROPHILS NFR BLD: 75.2 % (ref 38–73)
NITRITE UR QL STRIP: NEGATIVE
NRBC BLD-RTO: 0 /100 WBC
OPIATES UR QL SCN: NEGATIVE
PCP UR QL SCN>25 NG/ML: NEGATIVE
PH UR STRIP: 5 [PH] (ref 5–8)
PLATELET # BLD AUTO: 269 K/UL (ref 150–350)
PMV BLD AUTO: 8.3 FL (ref 9.2–12.9)
POC MOLECULAR INFLUENZA A AGN: NEGATIVE
POC MOLECULAR INFLUENZA B AGN: NEGATIVE
POTASSIUM SERPL-SCNC: 3.3 MMOL/L (ref 3.5–5.1)
PROT SERPL-MCNC: 7.5 G/DL (ref 6–8.4)
PROT UR QL STRIP: NEGATIVE
RBC # BLD AUTO: 4.03 M/UL (ref 4.6–6.2)
RBC #/AREA URNS AUTO: 2 /HPF (ref 0–4)
SODIUM SERPL-SCNC: 139 MMOL/L (ref 136–145)
SP GR UR STRIP: 1.02 (ref 1–1.03)
SQUAMOUS #/AREA URNS AUTO: 1 /HPF
TOXICOLOGY INFORMATION: NORMAL
TROPONIN I SERPL DL<=0.01 NG/ML-MCNC: <0.006 NG/ML (ref 0–0.03)
URN SPEC COLLECT METH UR: ABNORMAL
WBC # BLD AUTO: 18.15 K/UL (ref 3.9–12.7)
WBC #/AREA URNS AUTO: 1 /HPF (ref 0–5)

## 2020-01-01 PROCEDURE — 99285 EMERGENCY DEPT VISIT HI MDM: CPT | Mod: ,,, | Performed by: EMERGENCY MEDICINE

## 2020-01-01 PROCEDURE — 85025 COMPLETE CBC W/AUTO DIFF WBC: CPT

## 2020-01-01 PROCEDURE — 84484 ASSAY OF TROPONIN QUANT: CPT

## 2020-01-01 PROCEDURE — 93010 EKG 12-LEAD: ICD-10-PCS | Mod: ,,, | Performed by: INTERNAL MEDICINE

## 2020-01-01 PROCEDURE — 93010 ELECTROCARDIOGRAM REPORT: CPT | Mod: ,,, | Performed by: INTERNAL MEDICINE

## 2020-01-01 PROCEDURE — 36000 PLACE NEEDLE IN VEIN: CPT

## 2020-01-01 PROCEDURE — 99285 PR EMERGENCY DEPT VISIT,LEVEL V: ICD-10-PCS | Mod: ,,, | Performed by: EMERGENCY MEDICINE

## 2020-01-01 PROCEDURE — 80307 DRUG TEST PRSMV CHEM ANLYZR: CPT

## 2020-01-01 PROCEDURE — 80053 COMPREHEN METABOLIC PANEL: CPT

## 2020-01-01 PROCEDURE — 83690 ASSAY OF LIPASE: CPT

## 2020-01-01 PROCEDURE — 83880 ASSAY OF NATRIURETIC PEPTIDE: CPT

## 2020-01-01 PROCEDURE — 80320 DRUG SCREEN QUANTALCOHOLS: CPT

## 2020-01-01 PROCEDURE — 81001 URINALYSIS AUTO W/SCOPE: CPT

## 2020-01-01 PROCEDURE — 87502 INFLUENZA DNA AMP PROBE: CPT

## 2020-01-01 PROCEDURE — 93005 ELECTROCARDIOGRAM TRACING: CPT

## 2020-01-01 PROCEDURE — 99285 EMERGENCY DEPT VISIT HI MDM: CPT | Mod: 25

## 2020-01-01 NOTE — ED TRIAGE NOTES
Right sided chest pain and SOB the last 6 days per patient. Denies it radiating, denies anything making it worse. Rates pain 5/10.

## 2020-01-01 NOTE — DISCHARGE INSTRUCTIONS
Diagnosis: chest pain    Tests you had showed: EKG and labs did not show a heart attack.    Home Care Instructions:  - Continue taking your home medications as prescribed    Take ibuprofen (also called Advil, Motrin) for your pain. This medicine is available over-the-counter in 200 mg tablets.  - You may take 600 mg every 6 hours, or 800 mg every 8 hours as needed   - Do not take more than this amount, as it can cause kidney problems, bleeding in your stomach, and other serious problems.   - Do not also take naproxen (Aleve) at the same time or on the same day  - If you have heart problems or uncontrolled high blood pressure, you should not take ibuprofen for more than 3 days without discussing with your doctor    If your pain is not controlled with ibuprofen, you may also take acetaminophen (also called Tylenol).  - You may take up to 1,000 mg of Tylenol every 6 hours as needed  - Do not take more than 4,000 mg in 24 hours (1 day) as this may cause liver damage  - Many other medicines include acetaminophen (Tylenol) such as: Norco, Vicodin, Tylenol #3, many cold medicines, etc.  - Please read all labels carefully and do not combine medicines that include acetaminophen.  - If you have a history of liver disease or drink alcohol heavily, do not take acetaminophen (Tylenol) since it can damage your liver    Follow-Up Plan:  - Follow-up with: Primary care doctor within 3 - 5 days  - Follow-up for additional testing and/or evaluation as directed by your primary doctor    Return to the Emergency Department for symptoms including but not limited to: worsening symptoms, shortness of breath or chest pain, vomiting with inability to hold down fluids, fevers greater than 100.4°F, dizziness, passing out/fainting/unconsciousness, or other concerning symptoms.

## 2020-01-01 NOTE — ED PROVIDER NOTES
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 described as burning and squeezing. -cardiac history. +SOB     34-year-old male with PMHx of asthma and pancreatitis who presents to the ED with c/o chest pain. Per pt, he has had chest pain, SOB, rhinorrhea, congestion, and sneezing for the past 6 days. He describes his chest pain as a constant squeezing to his right sided chest, worse with coughing, and rated 5/10 currently. He has not taken anything OTC for his symptoms. He also report feeling confused and "off" for the past few days, stating, "I can't get all the words I want to say out." He has had blurred vision for the past 3 days. Per pt, he became suddenly weak 5 days ago and fell, hitting his head against a table and then the floor. He states that his fall was witnessed by his wife, who said he lost consciousness for a few minutes. He reports having chronic urinary frequency. Pt reports that his wife told him he could not return home until he was evaluated for his multiple medical complaints. Denies fevers, chills, abdominal, n/v/d, dysuria, hematuria, HA, or any other medical complaints. Smokes cigarettes.     The history is provided by the patient.     Review of patient's allergies indicates:  No Known Allergies  Past Medical History:   Diagnosis Date    Alcohol consumption of more than four drinks per week     Asthma     Back muscle spasm     Pancreatitis      History reviewed. No pertinent surgical history.  Family History   Problem Relation Age of Onset    Diabetes Mother      Social History     Tobacco Use    Smoking status: Current Some Day Smoker     Packs/day: 0.25     Types: Cigarettes    Smokeless tobacco: Never Used    Tobacco comment: Approximately 1 pack every 2-3 days   Substance Use Topics    Alcohol use: Yes     Alcohol/week: 1.0 - 2.0 standard drinks     Types: 1 " - 2 Standard drinks or equivalent per week     Frequency: 4 or more times a week     Comment: 3-4 days per week ; generally liquor    Drug use: Yes     Types: Marijuana     Comment: Daily     Review of Systems   Constitutional: Negative for chills and fever.   HENT: Positive for congestion, rhinorrhea and sneezing. Negative for sore throat.    Eyes: Negative for visual disturbance.   Respiratory: Positive for cough and shortness of breath.    Cardiovascular: Positive for chest pain. Negative for palpitations and leg swelling.   Gastrointestinal: Negative for abdominal pain, constipation, diarrhea and nausea.   Genitourinary: Negative for dysuria, frequency and hematuria.   Musculoskeletal: Negative for back pain and neck pain.   Skin: Negative for pallor.   Neurological: Negative for dizziness, weakness, light-headedness, numbness and headaches.   Psychiatric/Behavioral: Positive for confusion.       Physical Exam     Initial Vitals [01/01/20 0313]   BP Pulse Resp Temp SpO2   100/63 92 18 97.9 °F (36.6 °C) 96 %      MAP       --         Physical Exam    Nursing note and vitals reviewed.  Constitutional: He appears well-developed and well-nourished.   Pt is slurring words and has trouble finishing his thought/sentence requiring frequent redirecting.    HENT:   Head: Normocephalic and atraumatic.   Eyes: Conjunctivae and EOM are normal. Pupils are equal, round, and reactive to light.   Neck: Normal range of motion. Neck supple.   Cardiovascular: Normal rate, regular rhythm, normal heart sounds and intact distal pulses.   DP pulses 2+ bilaterally.    Pulmonary/Chest: Breath sounds normal. He has no wheezes. He has no rhonchi. He has no rales.   Abdominal: Soft. There is no tenderness. There is no rebound and no guarding.   No CVA tenderness.    Musculoskeletal: Normal range of motion. He exhibits no edema or tenderness.   No midline C, T, or L spinal TTP.    Neurological: He is alert and oriented to person, place, and  time. He has normal strength.   Strength and sensation intact and symmetric in upper and lower extremities.    Skin: Skin is warm. Capillary refill takes less than 2 seconds.   Well healed abrasion to left cheek.    Psychiatric: He has a normal mood and affect.         ED Course   Procedures  Labs Reviewed   CBC W/ AUTO DIFFERENTIAL - Abnormal; Notable for the following components:       Result Value    WBC 18.15 (*)     RBC 4.03 (*)     Hemoglobin 13.8 (*)     Mean Corpuscular Volume 102 (*)     Mean Corpuscular Hemoglobin 34.2 (*)     MPV 8.3 (*)     Gran # (ANC) 13.7 (*)     Immature Grans (Abs) 0.09 (*)     Mono # 1.4 (*)     Gran% 75.2 (*)     Lymph% 16.0 (*)     All other components within normal limits   COMPREHENSIVE METABOLIC PANEL - Abnormal; Notable for the following components:    Potassium 3.3 (*)     Glucose 111 (*)     All other components within normal limits   URINALYSIS, REFLEX TO URINE CULTURE - Abnormal; Notable for the following components:    Occult Blood UA 1+ (*)     All other components within normal limits    Narrative:     Preferred Collection Type->Urine, Clean Catch   ALCOHOL,MEDICAL (ETHANOL) - Abnormal; Notable for the following components:    Alcohol, Medical, Serum 15 (*)     All other components within normal limits    Narrative:     ADD-ON ALC #[id number] PER LEONEL CESPEDES MD 04:32 01/01/2020   ADD-ON LIPAS #[id number]; TROP #[id number] PER LEONEL CESPEDES MD   04:42  01/01/2020    DRUG SCREEN PANEL, URINE EMERGENCY   ALCOHOL,MEDICAL (ETHANOL)   LIPASE   TROPONIN I   B-TYPE NATRIURETIC PEPTIDE   URINALYSIS MICROSCOPIC    Narrative:     Preferred Collection Type->Urine, Clean Catch   LIPASE    Narrative:     ADD-ON ALC #[id number] PER LEONEL CESPEDES MD 04:32  01/01/2020   ADD-ON LIPAS #[id number]; TROP #[id number] PER LEONEL CESPEDES MD   04:42  01/01/2020    TROPONIN I    Narrative:     ADD-ON ALC #[id number] PER LEONEL CESPEDES MD 04:32  01/01/2020   ADD-ON LIPAS  #[id number]; TROP #[id number] PER LEONEL CESPEDES MD   04:42  01/01/2020    B-TYPE NATRIURETIC PEPTIDE   POCT INFLUENZA A/B MOLECULAR          Imaging Results          X-Ray Chest 1 View (In process)    Procedure changed from X-Ray Chest PA And Lateral                  Medical Decision Making:   History:   Old Medical Records: I decided to obtain old medical records.  Initial Assessment:   34-year-old male with PMHx of asthma and pancreatitis who presents to the ED with c/o chest pain. Per pt, he has had chest pain, SOB, rhinorrhea, congestion, and sneezing for the past 6 days. He describes his chest pain as a constant squeezing to his right sided chest. He also report feeling confused. He also reports falling with head trauma and LOC a few days ago. VSS. Afebrile. RRR. Lungs CTA bilaterally. Abdomen soft and nontender. Neurovascularly intact. However, pt is slurring words and has trouble finishing his thought/sentence.   Differential Diagnosis:   DDX includes but is not limited to acute toxicity, ICH, ACS, CHF exacerbation, influenza, asthma, exacerbation, anemia, electrolyte abnormality, homelessness, pancreatitis, GERD, PUD. Considered but do not suspect PE as pt is PERC negative.   Independently Interpreted Test(s):   I have ordered and independently interpreted X-rays - see summary below.  Clinical Tests:   Lab Tests: Ordered and Reviewed  Radiological Study: Ordered and Reviewed  Medical Tests: Reviewed and Ordered  ED Management:  Will obtain basic labs, single troponin (CP constant for 6 days), BNP, EKG, CXR, head CT, UDS, and UA.     Upon reassessment, pt is sleeping comfortably in recliner.      UA with 1+ occult blood, negative nitrites, negative leukocytes. UA positive for cocaine, amphetamines, and THC. Ethanol 15. Leukocytosis of 18.15 which may be secondary to cocaine use. EKG with NSR at rate of 80, no STEMI. Troponin undetectable, no indication to repeat as patient's CP have been constant for 6  days. Electrolyte WNL. Cr 1.2. LFTS within normal limits. Lipase 40.    I signed out the care of this patient to my supervising physician, Dr. Silva. Final dispo pending head CT.      I have discussed the treatment and management of this patient with my supervisory physician, and we agree on the plan of care.      Carol Ann Almeida PA-C    Additional MDM:   PERC Rule:   Age is greater than or equal to 50 = 0.0  Heart Rate is greater than or equal to 100 = 0.0  SaO2 on room air < 95% = 0.0  Unilateral leg swelling = 0.0  Hemoptysis = 0.0  Recent surgery or trauma = 0.0  Prior PE or DVT =  0.0  Hormone use = 0.00  PERC Score = 0           Attending Attestation:     Physician Attestation Statement for NP/PA:   I have conducted a face to face encounter with this patient in addition to the NP/PA, due to NP/PA Request    Other NP/PA Attestation Additions:      Medical Decision Makin-year-old male presenting to emergency department with multiple complaints including most notably chest pain and some confusion.  Here he is afebrile, stable, nontoxic, and neuro intact. His EKG is nonischemic.  A troponin is negative.  His story is not suspicious for ACS.  He is low risk by well's and perk negative, would not further pursue workup for PE.  Extensive workup including CT of head, chest x-ray, and lab work is unremarkable.  His urinalysis does demonstrate polysubstance abuse.    No findings of any emergency medical condition on my workup today.  He is appropriate for discharge. I was called by nursing staff as they felt that he was difficult to arouse.  Patient was sleeping soundly.  When I woke him up, his speech was not slurred, he was answering questions appropriately, he was able to walk unassisted.  He was clinically sober and appropriate for discharge. Return precautions were discussed at bedside.  He was stable at time of discharge.                               Clinical Impression:       ICD-10-CM ICD-9-CM   1.  Polysubstance abuse F19.10 305.90   2. Chest pain R07.9 786.50   3. Confusion R41.0 298.9         Disposition:   Disposition: Other  Condition: Stable                     Carol Ann Almeida PA-C  01/01/20 0534       Sherry Silva MD  01/01/20 1704

## 2020-01-01 NOTE — ED NOTES
Pt went to xray and pt is very somulent and appears to be sedated as noted on his urine drug screen; and then placed on cardiac monitor--NSR is noted on monitor.Pt hard to arouse when attempting to wake him and  get in a W/C. Pt was evaluated by Dr. Silva.

## 2020-03-01 ENCOUNTER — HOSPITAL ENCOUNTER (EMERGENCY)
Facility: HOSPITAL | Age: 35
Discharge: HOME | End: 2020-03-01
Attending: EMERGENCY MEDICINE

## 2020-03-01 VITALS
HEART RATE: 96 BPM | SYSTOLIC BLOOD PRESSURE: 118 MMHG | TEMPERATURE: 98 F | HEIGHT: 70 IN | RESPIRATION RATE: 16 BRPM | OXYGEN SATURATION: 94 % | DIASTOLIC BLOOD PRESSURE: 69 MMHG | BODY MASS INDEX: 24.05 KG/M2 | WEIGHT: 168 LBS

## 2020-03-01 DIAGNOSIS — J06.9 VIRAL URI WITH COUGH: Primary | ICD-10-CM

## 2020-03-01 LAB
CTP QC/QA: YES
POC MOLECULAR INFLUENZA A AGN: NEGATIVE
POC MOLECULAR INFLUENZA B AGN: NEGATIVE

## 2020-03-01 PROCEDURE — 96372 THER/PROPH/DIAG INJ SC/IM: CPT

## 2020-03-01 PROCEDURE — 99284 EMERGENCY DEPT VISIT MOD MDM: CPT | Mod: ,,, | Performed by: EMERGENCY MEDICINE

## 2020-03-01 PROCEDURE — 99284 PR EMERGENCY DEPT VISIT,LEVEL IV: ICD-10-PCS | Mod: ,,, | Performed by: EMERGENCY MEDICINE

## 2020-03-01 PROCEDURE — 99284 EMERGENCY DEPT VISIT MOD MDM: CPT | Mod: 25

## 2020-03-01 PROCEDURE — 63600175 PHARM REV CODE 636 W HCPCS: Performed by: STUDENT IN AN ORGANIZED HEALTH CARE EDUCATION/TRAINING PROGRAM

## 2020-03-01 PROCEDURE — 87502 INFLUENZA DNA AMP PROBE: CPT

## 2020-03-01 RX ORDER — DEXAMETHASONE SODIUM PHOSPHATE 4 MG/ML
8 INJECTION, SOLUTION INTRA-ARTICULAR; INTRALESIONAL; INTRAMUSCULAR; INTRAVENOUS; SOFT TISSUE
Status: COMPLETED | OUTPATIENT
Start: 2020-03-01 | End: 2020-03-01

## 2020-03-01 RX ADMIN — DEXAMETHASONE SODIUM PHOSPHATE 8 MG: 4 INJECTION, SOLUTION INTRA-ARTICULAR; INTRALESIONAL; INTRAMUSCULAR; INTRAVENOUS; SOFT TISSUE at 09:03

## 2020-03-02 NOTE — ED PROVIDER NOTES
Encounter Date: 3/1/2020       History     Chief Complaint   Patient presents with    Cough     cough, fever, congestions x2 days     HPI   Patient is a 34-year-old male who presents with chief complaint of cough productive of clear sputum, sore throat, and myalgias for 3 days.  States that he had a fever a day and a half ago.  He has been taking Tylenol, DayQuil and NyQuil for his symptoms, with mild improvement.  He reports that his 2-year-old daughter was recently sick.  Denies any dysuria, diarrhea, rash, vision changes, earache.  He states that his sore throat has been the worst of his symptoms, and has not been able to get much relief.  He has been able to eat and drink without issue.  He reports that he presented today because his boss told him he needed to be seen.    Review of patient's allergies indicates:  No Known Allergies  Past Medical History:   Diagnosis Date    Alcohol consumption of more than four drinks per week     Asthma     Back muscle spasm     Pancreatitis      History reviewed. No pertinent surgical history.  Family History   Problem Relation Age of Onset    Diabetes Mother      Social History     Tobacco Use    Smoking status: Current Some Day Smoker     Packs/day: 0.25     Types: Cigarettes    Smokeless tobacco: Never Used    Tobacco comment: Approximately 1 pack every 2-3 days   Substance Use Topics    Alcohol use: Yes     Alcohol/week: 1.0 - 2.0 standard drinks     Types: 1 - 2 Standard drinks or equivalent per week     Frequency: 4 or more times a week     Comment: 3-4 days per week ; generally liquor    Drug use: Yes     Types: Marijuana, Cocaine     Comment: marijuana 3x weekly and cocaine occasionally     Review of Systems   Constitutional: Positive for fever.   HENT: Positive for rhinorrhea and sore throat.    Eyes: Negative for visual disturbance.   Respiratory: Positive for cough. Negative for shortness of breath and wheezing.    Cardiovascular: Negative for chest pain.    Gastrointestinal: Negative for abdominal pain, nausea and vomiting.   Genitourinary: Negative for dysuria, hematuria and urgency.   Musculoskeletal: Positive for myalgias. Negative for back pain, neck pain and neck stiffness.   Skin: Negative for rash.   Neurological: Negative for weakness.   Psychiatric/Behavioral: Negative for confusion.       Physical Exam     Initial Vitals [03/01/20 1907]   BP Pulse Resp Temp SpO2   118/69 96 16 98.2 °F (36.8 °C) (!) 94 %      MAP       --         Physical Exam    Nursing note and vitals reviewed.  Constitutional: He appears well-developed and well-nourished. He is not diaphoretic.   HENT:   Head: Normocephalic and atraumatic.   Nose: Rhinorrhea present.   Mouth/Throat: Mucous membranes are normal. No uvula swelling. No oropharyngeal exudate or tonsillar abscesses. Posterior oropharyngeal erythema: mild posterior OP erythema.   Eyes: Conjunctivae and EOM are normal.   Neck: Normal range of motion. Neck supple.   Cardiovascular: Normal rate, regular rhythm, normal heart sounds and intact distal pulses.   No murmur heard.  Pulmonary/Chest: Breath sounds normal. No respiratory distress. He has no wheezes. He exhibits no tenderness.   Abdominal: Soft. Bowel sounds are normal. He exhibits no distension. There is no tenderness.   Musculoskeletal: Normal range of motion. He exhibits no edema or tenderness.   Lymphadenopathy:     He has no cervical adenopathy.   Neurological: He is alert. He has normal strength. No sensory deficit.   Skin: Skin is warm and dry. Capillary refill takes less than 2 seconds. No rash noted. No erythema.         ED Course   Procedures  Labs Reviewed   POCT INFLUENZA A/B MOLECULAR          Imaging Results    None                APC / Resident Notes:   HO-III Assessment    This is the emergent evaluation of a 34 y.o. male who presents with chief complaint of fever, sore throat, and myalgias. Symptoms consistent with viral URI. Ddx also includes asthma  exacerbation, PNA, bacterial pharyngitis, viral pharyngitis, influenza.    Patient presents with normal vitals, is afebrile, and nontoxic appearing. Does have rare coarse cough with minimal sputum production. No evidence of exudate in OP. Influenza test negative. Results discussed with patient. Will give IM decadron for sore throat. Also counseled on symptomatic treatment with warm tea and honey, and to stay out of work if he spikes another temp >100.4F. Given return precautions. Patient declined primary care referral. Stable for discharge at this time.    Jaylin Mina MD  [facility]  03/01/2020 9:13 PM                                  Clinical Impression:       ICD-10-CM ICD-9-CM   1. Viral URI with cough J06.9 465.9    B97.89                                 Jaylin Mina MD  Resident  03/01/20 2114

## 2020-03-02 NOTE — ED NOTES
Adult Physical Assessment  LOC: Orion Skaggs, 34 y.o. male verified via two identifiers.  The patient is awake, alert, oriented and speaking appropriately at this time.  APPEARANCE: Patient uncomfortable. Patient is clean and well groomed, patient's clothing is properly fastened.  SKIN:The skin is warm and dry, color consistent with ethnicity, patient has normal skin turgor and moist mucus membranes, skin intact, no breakdown or brusing noted.  MUSCULOSKELETAL: Patient moving all extremities well, no obvious swelling or deformities noted.  RESPIRATORY: Airway is open and patent, respirations are spontaneous, patient has a normal effort and rate, no accessory muscle use noted. Pt with productive cough and green sputum.  CARDIAC: Patient has a normal rate and rhythm, no periphreal edema noted in any extremity, capillary refill < 3 seconds in all extremities  ABDOMEN: Soft and non tender to palpation, no abdominal distention noted. Bowel sounds present in all four quadrants.  NEUROLOGIC: Eyes open spontaneously, behavior appropriate to situation, follows commands, facial expression symmetrical, bilateral hand grasp equal and even, purposeful motor response noted, normal sensation in all extremities when touched with a finger.

## 2020-03-02 NOTE — DISCHARGE INSTRUCTIONS
For your sore throat, I recommend either warm tea with honey, or cough drops with honey in them. The steroid shot you received this evening should also help with the pain.    For the cough and congestion, you can take Mucinex to thin out the mucous to make it easier to cough up.    Try to get some rest, and drink plenty of fluids. Wash your hands with soap and water, and avoid sharing cups and utensils until your symptoms start to improve.

## 2020-03-02 NOTE — ED TRIAGE NOTES
Pt states having productive cough with green sputum and body aches x3 days. Pt with fever a day and a half ago. Pt states taking dayquil, nyquil, tylenol, and smoking marijuana for relief. Pt has not received flu shot.

## 2020-06-01 ENCOUNTER — HOSPITAL ENCOUNTER (EMERGENCY)
Facility: HOSPITAL | Age: 35
Discharge: HOME | End: 2020-06-01
Attending: EMERGENCY MEDICINE
Payer: MEDICAID

## 2020-06-01 VITALS
SYSTOLIC BLOOD PRESSURE: 107 MMHG | BODY MASS INDEX: 22.48 KG/M2 | HEIGHT: 70 IN | RESPIRATION RATE: 18 BRPM | DIASTOLIC BLOOD PRESSURE: 66 MMHG | WEIGHT: 157 LBS | OXYGEN SATURATION: 99 % | HEART RATE: 59 BPM | TEMPERATURE: 99 F

## 2020-06-01 DIAGNOSIS — M54.9 BILATERAL BACK PAIN, UNSPECIFIED BACK LOCATION, UNSPECIFIED CHRONICITY: Primary | ICD-10-CM

## 2020-06-01 PROCEDURE — 99284 PR EMERGENCY DEPT VISIT,LEVEL IV: ICD-10-PCS | Mod: ,,, | Performed by: EMERGENCY MEDICINE

## 2020-06-01 PROCEDURE — 99284 EMERGENCY DEPT VISIT MOD MDM: CPT | Mod: ,,, | Performed by: EMERGENCY MEDICINE

## 2020-06-01 PROCEDURE — 99284 EMERGENCY DEPT VISIT MOD MDM: CPT

## 2020-06-01 PROCEDURE — 25000003 PHARM REV CODE 250: Performed by: EMERGENCY MEDICINE

## 2020-06-01 RX ORDER — LIDOCAINE 50 MG/G
1 PATCH TOPICAL
Status: DISCONTINUED | OUTPATIENT
Start: 2020-06-01 | End: 2020-06-01 | Stop reason: HOSPADM

## 2020-06-01 RX ORDER — IBUPROFEN 400 MG/1
400 TABLET ORAL
Status: COMPLETED | OUTPATIENT
Start: 2020-06-01 | End: 2020-06-01

## 2020-06-01 RX ORDER — METHOCARBAMOL 500 MG/1
1000 TABLET, FILM COATED ORAL 3 TIMES DAILY PRN
Qty: 30 TABLET | Refills: 0 | Status: SHIPPED | OUTPATIENT
Start: 2020-06-01 | End: 2020-06-06

## 2020-06-01 RX ORDER — IBUPROFEN 400 MG/1
400 TABLET ORAL EVERY 6 HOURS PRN
Qty: 20 TABLET | Refills: 0 | Status: SHIPPED | OUTPATIENT
Start: 2020-06-01

## 2020-06-01 RX ORDER — METHOCARBAMOL 500 MG/1
1000 TABLET, FILM COATED ORAL
Status: COMPLETED | OUTPATIENT
Start: 2020-06-01 | End: 2020-06-01

## 2020-06-01 RX ADMIN — LIDOCAINE 1 PATCH: 50 PATCH TOPICAL at 05:06

## 2020-06-01 RX ADMIN — METHOCARBAMOL TABLETS 1000 MG: 500 TABLET, COATED ORAL at 05:06

## 2020-06-01 RX ADMIN — IBUPROFEN 400 MG: 400 TABLET, FILM COATED ORAL at 05:06

## 2020-06-01 NOTE — DISCHARGE INSTRUCTIONS
Your physical exam is consistent with a likely muscular cause of your back pain.  Please take ibuprofen and a muscle relaxant as prescribed.  A muscle relaxant can make some people feel lightheaded or dizzy if this affects you in this way do not operate heavy machinery or drive while taking this medication.    Please attempt to take a few days off of work to attempt to rest your back.  Please still undertake her activities of daily living such as walking around your house.  Do not do any heavy lifting during this rest.    If you develop difficulty urinating, weakness in your legs, numbness in her legs, difficulty walking, pain that is out of control, or any other new, worsening or worrisome symptoms please return to the emergency department immediately for re-evaluation.    Your referred to [facility] for follow-up.  If your symptoms are not completely resolved please follow-up with them for continued evaluation.

## 2020-06-01 NOTE — PROVIDER PROGRESS NOTES - EMERGENCY DEPT.
Emergency Department TeleTRIAGE Encounter Note      CHIEF COMPLAINT    Chief Complaint   Patient presents with    Back Pain     down L side pulling       VITAL SIGNS   Initial Vitals [06/01/20 1636]   BP Pulse Resp Temp SpO2   107/66 (!) 59 18 98.5 °F (36.9 °C) 99 %      MAP       --            ALLERGIES    Review of patient's allergies indicates:  No Known Allergies    PROVIDER TRIAGE NOTE  The patient presents with low back pain radiating into his left thigh.  It has been ongoing for a few days.  He is unclear of the etiology but does report that he has had intermittent episodes of chronic back pain in the past.  He denies urinary incontinence, numbness, abdominal pain, or fever.      ORDERS  Labs Reviewed - No data to display    ED Orders (720h ago, onward)    None            Virtual Visit Note: The provider triage portion of this emergency department evaluation and documentation was performed via [employer], a HIPAA-compliant telemedicine application, in concert with a tele-presenter in the room. A face to face patient evaluation with one of my colleagues will occur once the patient is placed in an emergency department room.      DISCLAIMER: This note was prepared with [employer] voice recognition transcription software. Garbled syntax, mangled pronouns, and other bizarre constructions may be attributed to that software system.

## 2020-06-01 NOTE — ED TRIAGE NOTES
Orion Skaggs, a 34 y.o. male presents to the ED w/ complaint of lower back pain that started about a week ago.    Triage note:  Chief Complaint   Patient presents with    Back Pain     down L side pulling     Review of patient's allergies indicates:  No Known Allergies  Past Medical History:   Diagnosis Date    Alcohol consumption of more than four drinks per week     Asthma     Back muscle spasm     Pancreatitis

## 2020-06-01 NOTE — ED PROVIDER NOTES
Encounter Date: 6/1/2020       History     Chief Complaint   Patient presents with    Back Pain     down L side pulling     HPI   34-year-old male with past medical history of pancreatitis, alcohol consumption, asthma, back pain, coming in with several days of lower back pain.  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  No obvious moment where he injured himself at the pain is progressively worsening over the last several days.  No falls or trauma.  No direct impact to the back.  There is some symptoms to the left leg but no electric shocks on the left leg.  She denies weakness, bowel or bladder incontinence.  Numbness to the legs, numbness to the groin.  Patient has tried Epsom salts as well as doing sit-ups in the bathtub.  Has not tried any other medications at this time.  Denies chest pain, shortness of breath, abdominal pain.  Denies IV drug use.  Pain is worsened with heavy lifting.  Described as aching.  Moderate intensity.    My chart review patient presented several other times for similar back pain complaints.      Review of patient's allergies indicates:  No Known Allergies  Past Medical History:   Diagnosis Date    Alcohol consumption of more than four drinks per week     Asthma     Back muscle spasm     Pancreatitis      No past surgical history on file.  Family History   Problem Relation Age of Onset    Diabetes Mother      Social History     Tobacco Use    Smoking status: Current Some Day Smoker     Packs/day: 0.25     Types: Cigarettes    Smokeless tobacco: Never Used    Tobacco comment: Approximately 1 pack every 2-3 days   Substance Use Topics    Alcohol use: Yes     Alcohol/week: 1.0 - 2.0 standard drinks     Types: 1 - 2 Standard drinks or equivalent per week     Frequency: 4 or more times a week     Comment: 3-4 days per week ; generally liquor    Drug use: Yes     Types: Marijuana, Cocaine     Comment: marijuana 3x weekly and cocaine occasionally      Review of Systems   Constitutional:  No Fever  Eyes: No Vision Changes  ENT/Mouth: No sore throat  Cardiovascular:  No Chest Pain, No Palpitations  Respiratory:  No SOB  Gastrointestinal:  No abdo pain.  Genitourinary:  No  pain, No dysuria   Musculoskeletal:  Positive Back Pain, No Neck Pain, No recent trauma.  Skin:  No skin Lesions  Neuro:  No Weakness, No Numbness, No Paresthesias, No Dizziness, No Headache        Physical Exam     Initial Vitals [06/01/20 1636]   BP Pulse Resp Temp SpO2   107/66 (!) 59 18 98.5 °F (36.9 °C) 99 %      MAP       --         Physical Exam   Physical Exam:  CONSTITUTIONAL: Well developed, well nourished, in no acute distress.  HENT: Normocephalic, atraumatic    EYES: Sclerae anicteric   NECK: Supple, no thyroid enlargement  RESPIRATORY: Speaking in full sentences. Breathing comfortably.   ABDOMEN: Soft and nontender, no masses, no rebound or guarding   NEUROLOGIC: Alert, interacting normally. No facial droop.  5/5 strength bilateral lower extremities.  Normal sensation to light touch bilaterally in the lower extremities.  MSK:  There is no C or T-spine tenderness.  There is some mild midline lumbar spine tenderness.  Moving all four extremities.  Negative straight leg raise bilaterally.  Skin: Warm and dry. No visible rash on exposed areas of skin.    Psych: Mood and affect normal.       ED Course   Procedures  Labs Reviewed - No data to display       Imaging Results    None          Medical Decision Making:   History:   Old Medical Records: I decided to obtain old medical records.   34-year-old male with past medical history as noted coming in with midline lower back pain for the last several days  Exam with no focal findings concerning for cauda equina or radiculopathy at this time.  Likely MSK pain from working 3 jobs and multiple episodes of heavy lifting.  There is some mild midline tenderness however there is no direct trauma or falls is not consistent with a  fracture.  Patient has not tried significant medications to treat this will give Motrin and muscle relaxants.  Patient says Tylenol upset the stomach.  Also place Lidoderm patch to the area of maximum pain.  Discharge with NSAIDs and muscle relaxants.  Instructed to attempt to take a few days off of work to allow for the back to rest/heel.    No IV drug use, not consistent with spinal infection at this time.  Pain is lower back, no abdominal tenderness or pain, not consistent with pancreatitis.    Will discharge with referral to [facility] as well as return precautions.    Findings of ED work up explained to patient. Patient agrees with discharge plan and verbalizes understanding of return precautions.     MDM Complexity Points:   Problem Points:  1.New problem, with no additional ED work-up planned (maximum of 1) -    Data Points:  Decision to obtain old records (in the EHR) and Review and summarization of old records                                                              Jose Juan Helton MD  06/01/20 8015

## 2020-08-15 ENCOUNTER — HOSPITAL ENCOUNTER (EMERGENCY)
Facility: HOSPITAL | Age: 35
Discharge: HOME | End: 2020-08-15
Attending: EMERGENCY MEDICINE
Payer: MEDICAID

## 2020-08-15 VITALS
OXYGEN SATURATION: 98 % | TEMPERATURE: 99 F | HEART RATE: 65 BPM | RESPIRATION RATE: 18 BRPM | SYSTOLIC BLOOD PRESSURE: 125 MMHG | DIASTOLIC BLOOD PRESSURE: 75 MMHG

## 2020-08-15 DIAGNOSIS — R10.12 LEFT UPPER QUADRANT PAIN: Primary | ICD-10-CM

## 2020-08-15 LAB
ALBUMIN SERPL BCP-MCNC: 4.1 G/DL (ref 3.5–5.2)
ALP SERPL-CCNC: 62 U/L (ref 55–135)
ALT SERPL W/O P-5'-P-CCNC: 24 U/L (ref 10–44)
ANION GAP SERPL CALC-SCNC: 10 MMOL/L (ref 8–16)
AST SERPL-CCNC: 32 U/L (ref 10–40)
BASOPHILS # BLD AUTO: 0.04 K/UL (ref 0–0.2)
BASOPHILS NFR BLD: 0.5 % (ref 0–1.9)
BILIRUB SERPL-MCNC: 0.8 MG/DL (ref 0.1–1)
BUN SERPL-MCNC: 9 MG/DL (ref 6–20)
CALCIUM SERPL-MCNC: 8.9 MG/DL (ref 8.7–10.5)
CHLORIDE SERPL-SCNC: 102 MMOL/L (ref 95–110)
CO2 SERPL-SCNC: 25 MMOL/L (ref 23–29)
CREAT SERPL-MCNC: 1 MG/DL (ref 0.5–1.4)
DIFFERENTIAL METHOD: ABNORMAL
EOSINOPHIL # BLD AUTO: 0.1 K/UL (ref 0–0.5)
EOSINOPHIL NFR BLD: 1.6 % (ref 0–8)
ERYTHROCYTE [DISTWIDTH] IN BLOOD BY AUTOMATED COUNT: 14.1 % (ref 11.5–14.5)
EST. GFR  (AFRICAN AMERICAN): >60 ML/MIN/1.73 M^2
EST. GFR  (NON AFRICAN AMERICAN): >60 ML/MIN/1.73 M^2
GLUCOSE SERPL-MCNC: 78 MG/DL (ref 70–110)
HCT VFR BLD AUTO: 39.3 % (ref 40–54)
HGB BLD-MCNC: 13.1 G/DL (ref 14–18)
IMM GRANULOCYTES # BLD AUTO: 0.02 K/UL (ref 0–0.04)
IMM GRANULOCYTES NFR BLD AUTO: 0.3 % (ref 0–0.5)
INR PPP: 1 (ref 0.8–1.2)
LIPASE SERPL-CCNC: 30 U/L (ref 4–60)
LYMPHOCYTES # BLD AUTO: 2.9 K/UL (ref 1–4.8)
LYMPHOCYTES NFR BLD: 39.5 % (ref 18–48)
MCH RBC QN AUTO: 33.9 PG (ref 27–31)
MCHC RBC AUTO-ENTMCNC: 33.3 G/DL (ref 32–36)
MCV RBC AUTO: 102 FL (ref 82–98)
MONOCYTES # BLD AUTO: 1 K/UL (ref 0.3–1)
MONOCYTES NFR BLD: 13 % (ref 4–15)
NEUTROPHILS # BLD AUTO: 3.3 K/UL (ref 1.8–7.7)
NEUTROPHILS NFR BLD: 45.1 % (ref 38–73)
NRBC BLD-RTO: 0 /100 WBC
PLATELET # BLD AUTO: 217 K/UL (ref 150–350)
PMV BLD AUTO: 8.7 FL (ref 9.2–12.9)
POTASSIUM SERPL-SCNC: 3.9 MMOL/L (ref 3.5–5.1)
PROT SERPL-MCNC: 7.7 G/DL (ref 6–8.4)
PROTHROMBIN TIME: 10.6 SEC (ref 9–12.5)
RBC # BLD AUTO: 3.86 M/UL (ref 4.6–6.2)
SODIUM SERPL-SCNC: 137 MMOL/L (ref 136–145)
WBC # BLD AUTO: 7.37 K/UL (ref 3.9–12.7)

## 2020-08-15 PROCEDURE — 25000003 PHARM REV CODE 250: Performed by: EMERGENCY MEDICINE

## 2020-08-15 PROCEDURE — 85025 COMPLETE CBC W/AUTO DIFF WBC: CPT

## 2020-08-15 PROCEDURE — 99283 EMERGENCY DEPT VISIT LOW MDM: CPT

## 2020-08-15 PROCEDURE — 85610 PROTHROMBIN TIME: CPT

## 2020-08-15 PROCEDURE — 99282 PR EMERGENCY DEPT VISIT,LEVEL II: ICD-10-PCS | Mod: ,,, | Performed by: EMERGENCY MEDICINE

## 2020-08-15 PROCEDURE — 80053 COMPREHEN METABOLIC PANEL: CPT

## 2020-08-15 PROCEDURE — 83690 ASSAY OF LIPASE: CPT

## 2020-08-15 PROCEDURE — 99282 EMERGENCY DEPT VISIT SF MDM: CPT | Mod: ,,, | Performed by: EMERGENCY MEDICINE

## 2020-08-15 RX ORDER — LIDOCAINE 50 MG/G
1 PATCH TOPICAL
Status: DISCONTINUED | OUTPATIENT
Start: 2020-08-15 | End: 2020-08-16 | Stop reason: HOSPADM

## 2020-08-15 RX ADMIN — LIDOCAINE 1 PATCH: 50 PATCH TOPICAL at 08:08

## 2020-08-16 NOTE — ED PROVIDER NOTES
"SCRIBE #1 NOTE: I, Lou Deshpande, am scribing for, and in the presence of, Swapna Moses MD.       CC: Abdominal Pain (abdominal pain under left rib that started today after lifiting a heavy box at work. Pt states, "feels like someone is punching me". Denies SOB, N/V)      History provided by:   Patient    HPI: Orion Skaggs is a 35 y.o. year old male with PMH of back pain, pancreatitis, alcohol dependency who presents to the ED complaining of central abdominal pain that started today while he was lifting boxes at work about 30 minutes PTA. He describes the pain as a pressure sensation. The pain is localized to his abdomen. The pain is worse with bending over, and lying down is more comfortable. He also noticed his hands were shaking. He denies any nausea, vomiting, fevers, or problems with bowel movements or urination. The patient states he was admitted to [facility] for liver damage due to Tylenol overdose. He says he was taking Tylenol for depression. He was in the hospital for 15 days and was discharged on July 20th. He says his doctors did not mention a liver transplant. He takes Fluoxetine, Vitamin C, a multivitamin, and 2 other medications for which he does not remember the names. The patient states he usually drinks heavily but has been drinking less since his hospital stay. He had a beer last night, and he states he has had approximately 2 beers per week since leaving the hospital. He denies any suicidal ideation.         Past Medical History:   Diagnosis Date    Alcohol consumption of more than four drinks per week     Asthma     Back muscle spasm     Pancreatitis      No past surgical history on file.  Family History   Problem Relation Age of Onset    Diabetes Mother      No current facility-administered medications on file prior to encounter.      Current Outpatient Medications on File Prior to Encounter   Medication Sig Dispense Refill    ibuprofen (ADVIL,MOTRIN) 400 MG tablet " Take 1 tablet (400 mg total) by mouth every 6 (six) hours as needed. 20 tablet 0    ranitidine (ZANTAC) 300 MG tablet Take 1 tablet (300 mg total) by mouth once daily. for 7 days 7 tablet 0     Patient has no known allergies.  Social History     Socioeconomic History    Marital status:      Spouse name: Not on file    Number of children: Not on file    Years of education: Not on file    Highest education level: Not on file   Occupational History    Not on file   Social Needs    Financial resource strain: Not on file    Food insecurity     Worry: Not on file     Inability: Not on file    Transportation needs     Medical: Not on file     Non-medical: Not on file   Tobacco Use    Smoking status: Current Some Day Smoker     Packs/day: 0.25     Types: Cigarettes    Smokeless tobacco: Never Used    Tobacco comment: Approximately 1 pack every 2-3 days   Substance and Sexual Activity    Alcohol use: Yes     Alcohol/week: 1.0 - 2.0 standard drinks     Types: 1 - 2 Standard drinks or equivalent per week     Frequency: 4 or more times a week     Comment: 3-4 days per week ; generally liquor    Drug use: Yes     Types: Marijuana, Cocaine     Comment: marijuana 3x weekly and cocaine occasionally    Sexual activity: Yes     Partners: Female   Lifestyle    Physical activity     Days per week: Not on file     Minutes per session: Not on file    Stress: Not on file   Relationships    Social connections     Talks on phone: Not on file     Gets together: Not on file     Attends Latter day service: Not on file     Active member of club or organization: Not on file     Attends meetings of clubs or organizations: Not on file     Relationship status: Not on file   Other Topics Concern    Not on file   Social History Narrative    Not on file       ROS:     Constitutional : neg for fever, neg for weakness  HENT neg for head injury, neg for sore throat  Eyes: neg for visual changes, neg for eye pain  Resp neg for  SOB, neg for cough  Cardiac  neg for chest pain, neg for palpitations  GI pos for abd pain, neg for nausea, neg for vomiting, neg bowel movement problems   neg for urinary changes  Neuro neg for focal weakness or numbness, pos hands shaking  Skin neg for skin rash  MSK: neg for myalgia, neg for arthralgia  PSYCH: neg suicidal ideation  ALL: Patient has no known allergies.    PHYSICAL EXAM:  Vitals:    08/15/20 2016   BP: 120/67   Pulse: 63   Resp: 18   Temp: 98.1 °F (36.7 °C)         PHYSICAL EXAM:     general: comfortable, in no acute distress, pleasant, well nourished  VS: triage VS reviewed  HENT: NC/AT  Eyes: PERRL, EOMI  CV: RRR, no  murmurs, no rubs, no gallops, no LE edema  Resp: comfortable breathing, speaks in full sentences, CTAB, no wheezing, no crackles, no ronchi  ABD:  soft, ND, + normal BS, pain in LUQ epigastric area  Renal: No CVAT  Neuro: AAO x 3, 5/5 strength x 4 extremities, sensation intact, face symmetric, speech normal  MSK: no deformity, no edema, No midline C,T,L tenderness to palpation            DATA & INTERVENTIONS:    LABS reviewed:  Labs Reviewed - No data to display    RADIOLOGY reviewed:  Imaging Results    None         MEDICATIONS/FLUIDS:  Medications - No data to display      MDM:  Orion Skaggs is a 35 y.o. year old male who presents to the ED complaining of left upper quadrant tenderness to palpation that started after lifting a heavy box at work.  Patient reports recent admission for Tylenol overdose at this [facility] however asymptomatic since he left the hospital on July 20, 2020 reported drinking 1 or 2 beers a week since he left the hospital, denies any recent over does no suicidal ideation  In the emergency department localize the pain to the left upper quadrant no peritoneal signs  Lidocaine patch for pain control  Will check blood work, low suspicion for spleen laceration intra-abdominal bleed based on the mechanism of injury    DDX includes but not limited to: as  above    Labs ordered and reviewed:  CMP normal LFTs, normal electrolytes and kidney function  CBC normal platelets and white blood cell count, hemoglobin 13.1 close to hemoglobin of 13.8 in Jan 2020  Lipase normal  INR normal    Medication given in the ED: Lidocaine 5% patch    On reasessment patient feels comfortable, pain improved. Discussed pain control with OTC analgesics: ibuprofen, lidocaine patches.  The patient has been carefully educated about symptoms and conditions that should prompt immediate return to the ED for recheck or further evaluation. Told to return immediately for any new or worsening or progressive symptoms.    IMPRESSION:  1.) abdominal pain of unknown etiology, possible muscle strain      Dispo: Discharge    Critical Care Time: N/A     Swapna Moses MD  08/15/20 4404

## 2020-08-16 NOTE — ED PROVIDER NOTES
"CC: Abdominal Pain (abdominal pain under left rib that started today after lifiting a heavy box at work. Pt states, "feels like someone is punching me". Denies SOB, N/V)      History provided by:   Patient ***  Family***    HPI: Orion Skaggs is a 35 y.o. year old male PMH of asthma, back muscle spasm, pancreatitis, who presents to the ED complaining of             Past Medical History:   Diagnosis Date    Alcohol consumption of more than four drinks per week     Asthma     Back muscle spasm     Pancreatitis      No past surgical history on file.  Family History   Problem Relation Age of Onset    Diabetes Mother      No current facility-administered medications on file prior to encounter.      Current Outpatient Medications on File Prior to Encounter   Medication Sig Dispense Refill    ibuprofen (ADVIL,MOTRIN) 400 MG tablet Take 1 tablet (400 mg total) by mouth every 6 (six) hours as needed. 20 tablet 0    ranitidine (ZANTAC) 300 MG tablet Take 1 tablet (300 mg total) by mouth once daily. for 7 days 7 tablet 0     Patient has no known allergies.  Social History     Socioeconomic History    Marital status:      Spouse name: Not on file    Number of children: Not on file    Years of education: Not on file    Highest education level: Not on file   Occupational History    Not on file   Social Needs    Financial resource strain: Not on file    Food insecurity     Worry: Not on file     Inability: Not on file    Transportation needs     Medical: Not on file     Non-medical: Not on file   Tobacco Use    Smoking status: Current Some Day Smoker     Packs/day: 0.25     Types: Cigarettes    Smokeless tobacco: Never Used    Tobacco comment: Approximately 1 pack every 2-3 days   Substance and Sexual Activity    Alcohol use: Yes     Alcohol/week: 1.0 - 2.0 standard drinks     Types: 1 - 2 Standard drinks or equivalent per week     Frequency: 4 or more times a week     Comment: 3-4 days per week ; " generally liquor    Drug use: Yes     Types: Marijuana, Cocaine     Comment: marijuana 3x weekly and cocaine occasionally    Sexual activity: Yes     Partners: Female   Lifestyle    Physical activity     Days per week: Not on file     Minutes per session: Not on file    Stress: Not on file   Relationships    Social connections     Talks on phone: Not on file     Gets together: Not on file     Attends Judaism service: Not on file     Active member of club or organization: Not on file     Attends meetings of clubs or organizations: Not on file     Relationship status: Not on file   Other Topics Concern    Not on file   Social History Narrative    Not on file       ROS:     Constitutional : neg for fever, neg for weakness  HENT neg for head injury, neg for sore throat  Eyes: neg for visual changes, neg for eye pain  Resp neg for SOB, neg for cough  Cardiac  neg for chest pain, neg for palpitations  GI neg for abd pain, neg for nausea, neg for vomiting   neg for urinary changes  Neuro neg for focal weakness or numbness  Skin neg for skin rash  MSK: neg for myalgia, neg for arthralgia  ALL: Patient has no known allergies.    PHYSICAL EXAM:  Vitals:    08/15/20 2016   BP: 120/67   Pulse: 63   Resp: 18   Temp: 98.1 °F (36.7 °C)         PHYSICAL EXAM:     general: comfortable, in no acute distress, pleasant, well nourished  VS: triage VS reviewed  HENT: NC/AT  Eyes: PERRL, EOMI  CV: RRR, no  murmurs, no rubs, no gallops, no LE edema  Resp: comfortable breathing, speaks in full sentences, CTAB, no wheezing, no crackles, no ronchi  ABD:  soft, ND, + normal BS, NT  Renal: No CVAT  Neuro: AAO x 3, 5/5 strength x 4 extremities, sensation intact, face symmetric, speech normal  MSK: no deformity, no edema            DATA & INTERVENTIONS:    LABS reviewed:  Labs Reviewed - No data to display    RADIOLOGY reviewed:  Imaging Results    None         MEDICATIONS/FLUIDS:  Medications - No data to display      MDM:  Orion SOLO  Sharifa is a 35 y.o. year old male who presents to the ED complaining of    DDX includes but not limited to: ***    Labs ordered and reviewed: ***  Medication given in the ED: ***    CXR (ordered and reviewed): ***  Imagings independently visualized: ***    EKG (independantly reviewed): ***    Old records obtained and reviewed: ***    Case discussed with the consultant: ***    IMPRESSION:  1.) ***  2.) ***    Dispo: Discharge***  Admission***  Observation***    Critical Care Time: N/A***

## 2020-12-07 ENCOUNTER — HOSPITAL ENCOUNTER (EMERGENCY)
Facility: HOSPITAL | Age: 35
Discharge: HOME | End: 2020-12-07
Attending: EMERGENCY MEDICINE
Payer: MEDICAID

## 2020-12-07 VITALS
OXYGEN SATURATION: 99 % | DIASTOLIC BLOOD PRESSURE: 75 MMHG | BODY MASS INDEX: 24.29 KG/M2 | RESPIRATION RATE: 19 BRPM | SYSTOLIC BLOOD PRESSURE: 145 MMHG | TEMPERATURE: 99 F | WEIGHT: 164 LBS | HEART RATE: 60 BPM | HEIGHT: 69 IN

## 2020-12-07 DIAGNOSIS — R55 SYNCOPE, UNSPECIFIED SYNCOPE TYPE: Primary | ICD-10-CM

## 2020-12-07 DIAGNOSIS — M54.2 NECK PAIN: ICD-10-CM

## 2020-12-07 DIAGNOSIS — R40.20 LOC (LOSS OF CONSCIOUSNESS): ICD-10-CM

## 2020-12-07 LAB
BUN SERPL-MCNC: 11 MG/DL (ref 6–30)
CHLORIDE SERPL-SCNC: 103 MMOL/L (ref 95–110)
CREAT SERPL-MCNC: 1.1 MG/DL (ref 0.5–1.4)
GLUCOSE SERPL-MCNC: 86 MG/DL (ref 70–110)
HCT VFR BLD CALC: 42 %PCV (ref 36–54)
POC IONIZED CALCIUM: 1.02 MMOL/L (ref 1.06–1.42)
POC TCO2 (MEASURED): 27 MMOL/L (ref 23–29)
POTASSIUM BLD-SCNC: 3.5 MMOL/L (ref 3.5–5.1)
SAMPLE: ABNORMAL
SODIUM BLD-SCNC: 138 MMOL/L (ref 136–145)

## 2020-12-07 PROCEDURE — 99284 EMERGENCY DEPT VISIT MOD MDM: CPT | Mod: 25

## 2020-12-07 PROCEDURE — 63600175 PHARM REV CODE 636 W HCPCS: Performed by: PHYSICIAN ASSISTANT

## 2020-12-07 PROCEDURE — 93010 ELECTROCARDIOGRAM REPORT: CPT | Mod: ,,, | Performed by: INTERNAL MEDICINE

## 2020-12-07 PROCEDURE — 93010 EKG 12-LEAD: ICD-10-PCS | Mod: ,,, | Performed by: INTERNAL MEDICINE

## 2020-12-07 PROCEDURE — 93005 ELECTROCARDIOGRAM TRACING: CPT

## 2020-12-07 PROCEDURE — 96361 HYDRATE IV INFUSION ADD-ON: CPT

## 2020-12-07 PROCEDURE — 96374 THER/PROPH/DIAG INJ IV PUSH: CPT

## 2020-12-07 PROCEDURE — 25000003 PHARM REV CODE 250: Performed by: PHYSICIAN ASSISTANT

## 2020-12-07 PROCEDURE — 99284 PR EMERGENCY DEPT VISIT,LEVEL IV: ICD-10-PCS | Mod: ,,, | Performed by: PHYSICIAN ASSISTANT

## 2020-12-07 PROCEDURE — 80047 BASIC METABLC PNL IONIZED CA: CPT

## 2020-12-07 PROCEDURE — 99284 EMERGENCY DEPT VISIT MOD MDM: CPT | Mod: ,,, | Performed by: PHYSICIAN ASSISTANT

## 2020-12-07 RX ORDER — LIDOCAINE 50 MG/G
1 PATCH TOPICAL DAILY
Qty: 15 PATCH | Refills: 2 | Status: SHIPPED | OUTPATIENT
Start: 2020-12-07

## 2020-12-07 RX ORDER — FLUOXETINE HYDROCHLORIDE 20 MG/1
20 CAPSULE ORAL DAILY
COMMUNITY

## 2020-12-07 RX ORDER — METHOCARBAMOL 500 MG/1
1000 TABLET, FILM COATED ORAL 3 TIMES DAILY PRN
Qty: 20 TABLET | Refills: 0 | Status: SHIPPED | OUTPATIENT
Start: 2020-12-07 | End: 2020-12-12

## 2020-12-07 RX ORDER — ACETAMINOPHEN 500 MG
1000 TABLET ORAL
Status: COMPLETED | OUTPATIENT
Start: 2020-12-07 | End: 2020-12-07

## 2020-12-07 RX ORDER — KETOROLAC TROMETHAMINE 30 MG/ML
10 INJECTION, SOLUTION INTRAMUSCULAR; INTRAVENOUS
Status: COMPLETED | OUTPATIENT
Start: 2020-12-07 | End: 2020-12-07

## 2020-12-07 RX ORDER — LIDOCAINE 50 MG/G
1 PATCH TOPICAL
Status: DISCONTINUED | OUTPATIENT
Start: 2020-12-07 | End: 2020-12-07 | Stop reason: HOSPADM

## 2020-12-07 RX ORDER — NAPROXEN 500 MG/1
500 TABLET ORAL 2 TIMES DAILY WITH MEALS
Qty: 30 TABLET | Refills: 0 | Status: SHIPPED | OUTPATIENT
Start: 2020-12-07

## 2020-12-07 RX ADMIN — SODIUM CHLORIDE 1000 ML: 0.9 INJECTION, SOLUTION INTRAVENOUS at 04:12

## 2020-12-07 RX ADMIN — LIDOCAINE 1 PATCH: 50 PATCH TOPICAL at 04:12

## 2020-12-07 RX ADMIN — KETOROLAC TROMETHAMINE 10 MG: 30 INJECTION, SOLUTION INTRAMUSCULAR at 04:12

## 2020-12-07 RX ADMIN — ACETAMINOPHEN 1000 MG: 500 TABLET ORAL at 04:12

## 2020-12-07 NOTE — ED PROVIDER NOTES
Encounter Date: 12/7/2020       History     Chief Complaint   Patient presents with    Loss of Consciousness     Pt reports passing out at 0000 and now c/o HA, neck and lower back pain. He reports feeling lightheaded yesterday.      35-year-old male with history of pancreatitis, asthma and back pain presents for loss of consciousness.  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  His friend told him to sit down because he looked ill and he passed out.  He woke up on the ground, he believes he probably fell off of a bar stool.  Presently, he is endorsing a holocranial, tight headache as well as some neck pain. He did not take any medications prior to arrival. He states that he barely ate anything today and has been working two jobs and sleeping very little recently. He denies any headache prior to the fall, visual changes, numbness/tingling/weakness, gait disturbance or difficulty with speech. He denies any chest pain, shortness of breath or palpitations prior to or after the fall.        Review of patient's allergies indicates:  No Known Allergies  Past Medical History:   Diagnosis Date    Alcohol consumption of more than four drinks per week     Asthma     Back muscle spasm     Pancreatitis      No past surgical history on file.  Family History   Problem Relation Age of Onset    Diabetes Mother      Social History     Tobacco Use    Smoking status: Current Some Day Smoker     Packs/day: 0.25     Types: Cigarettes    Smokeless tobacco: Never Used    Tobacco comment: Approximately 1 pack every 2-3 days   Substance Use Topics    Alcohol use: Yes     Alcohol/week: 1.0 - 2.0 standard drinks     Types: 1 - 2 Standard drinks or equivalent per week     Frequency: 4 or more times a week     Comment: 3-4 days per week ; generally liquor    Drug use: Yes     Types: Marijuana, Cocaine     Comment: marijuana 3x weekly and cocaine occasionally     Review of  Systems   Constitutional: Negative for fever.   HENT: Negative for sore throat.    Eyes: Positive for photophobia. Negative for visual disturbance.   Respiratory: Negative for shortness of breath.    Cardiovascular: Negative for chest pain, palpitations and leg swelling.   Gastrointestinal: Negative for abdominal pain, nausea and vomiting.   Genitourinary: Negative for dysuria.   Musculoskeletal: Positive for neck pain. Negative for back pain, myalgias and neck stiffness.   Skin: Negative for rash.   Neurological: Positive for syncope, light-headedness and headaches. Negative for dizziness, tremors, seizures, facial asymmetry, speech difficulty, weakness and numbness.   Hematological: Does not bruise/bleed easily.       Physical Exam     Initial Vitals [12/07/20 0343]   BP Pulse Resp Temp SpO2   (!) 145/75 66 16 98.7 °F (37.1 °C) 97 %      MAP       --         Physical Exam    Nursing note and vitals reviewed.  Constitutional: He appears well-developed and well-nourished. He is not diaphoretic. No distress.   HENT:   Head: Normocephalic and atraumatic. Head is without raccoon's eyes, without Sanchez's sign, without abrasion, without contusion and without laceration.   Eyes: EOM are normal. Pupils are equal, round, and reactive to light.   Neck: Normal range of motion. Neck supple.   Midline tenderness to palpation of the base of the neck.  No step-offs or bony abnormalities   Cardiovascular: Normal rate, regular rhythm, normal heart sounds and intact distal pulses. Exam reveals no gallop and no friction rub.    No murmur heard.  Pulmonary/Chest: Breath sounds normal. No respiratory distress. He has no wheezes. He has no rhonchi. He has no rales. He exhibits no tenderness.   Abdominal: Soft. Bowel sounds are normal.   Musculoskeletal: Normal range of motion.      Comments: Midline tenderness to palpation of the base of the C-Spine. No step offs or bony abnormalities. No midline tenderness to palpation of T or L spine    Neurological: He is alert and oriented to person, place, and time. He has normal strength. No cranial nerve deficit or sensory deficit. GCS score is 15. GCS eye subscore is 4. GCS verbal subscore is 5. GCS motor subscore is 6.   Skin: Skin is warm and dry.   Psychiatric: He has a normal mood and affect.         ED Course   Procedures  Labs Reviewed   ISTAT PROCEDURE - Abnormal; Notable for the following components:       Result Value    POC Ionized Calcium 1.02 (*)     All other components within normal limits     EKG Readings: (Independently Interpreted)   Initial Reading: No STEMI. Previous EKG: Compared with most recent EKG Previous EKG Date: 1/1/2020. Rhythm: Normal Sinus Rhythm. Heart Rate: 85. Ectopy: No Ectopy. ST Segments: Normal ST Segments. T Waves: Normal. Clinical Impression: Normal Sinus Rhythm     ECG Results          EKG 12-lead (Final result)  Result time 12/07/20 12:25:30    Final result by Interface, Lab In [facility] (12/07/20 12:25:30)                 Narrative:    Test Reason : R40.20,    Vent. Rate : 065 BPM     Atrial Rate : 065 BPM     P-R Int : 190 ms          QRS Dur : 102 ms      QT Int : 406 ms       P-R-T Axes : 064 109 031 degrees     QTc Int : 422 ms    Normal sinus rhythm  Rightward axis  Incomplete right bundle branch block  Abnormal ECG  When compared with ECG of 01-JAN-2020 03:18,  No significant change was found  Confirmed by ANTONY CHATMAN MD ([id number]) on 12/7/2020 12:25:22 PM    Referred By: [email or web address]   SELF           Confirmed By:ANTONY CHATMAN MD                            Imaging Results          X-Ray Cervical Spine AP And Lateral (Final result)  Result time 12/07/20 05:20:53    Final result by Karina Rahman MD (12/07/20 05:20:53)                 Impression:      No radiographic evidence of acute fracture.  Clinical correlation and further evaluation as warranted.      Electronically signed by: Karina Rahman MD  Date:    12/07/2020  Time:    05:20             Narrative:     EXAMINATION:  XR CERVICAL SPINE AP LATERAL    CLINICAL HISTORY:  Cervicalgia    TECHNIQUE:  AP, lateral and open mouth views of the cervical spine were performed.    COMPARISON:  None.    FINDINGS:  The cervical spine is visualized through the T1 level on the lateral view.  There is straightening of normal cervical lordosis which can be seen with patient positioning or muscle spasm.  Otherwise, cervical vertebral body alignment is within normal limits.  Vertebral body and intervertebral disc heights appear maintained without significant discogenic change.  Small anterior osteophyte at the C6-C7 level.  The odontoid process is intact.  No significant prevertebral soft tissue swelling.  Visualized lung apices appear grossly clear.                                 Medical Decision Making:   Initial Assessment:   35 year old male presenting for neck pain after a syncopal episode. Mildly hypertensive at 145/75 with otherwise normal vitals. He has some tenderness to the base of his neck but no other obvious signs of trauma. He is alert and oriented with normal strength and sensation.  Differential Diagnosis:   Vaso vagal syncope  Dehydration  Electrolyte derangement  Low suspicion for cardiac syncope given clear prodrome  Muscle strain  Low suspicion for cervical fracture but given midline tenderness will do X-ray    Independently Interpreted Test(s):   I have ordered and independently interpreted X-rays - see summary below.       <> Summary of X-Ray Reading(s): No fracture  I have ordered and independently interpreted EKG Reading(s) - see prior notes  Clinical Tests:   Lab Tests: Ordered and Reviewed  Radiological Study: Ordered and Reviewed  Medical Tests: Ordered and Reviewed  ED Management:  Will checks labs, EKG, x-ray neck, give fluid bolus, tylenol, toradol and reassess.    Patient reports some improvement of pain after therapy. Labs are unremarkable. Will discharge with instructions to follow up with PCP and return  to the ED for worsening symptoms. Stressed the importance of follow-up, strict ED return precautions given.  Patient voiced understanding and is comfortable with discharge.                              Clinical Impression:       ICD-10-CM ICD-9-CM   1. Syncope, unspecified syncope type  R55 780.2   2. LOC (loss of consciousness)  R40.20 780.09   3. Neck pain  M54.2 723.1                      Disposition:   Disposition: Discharged  Condition: Stable     ED Disposition Condition    Discharge Stable        ED Prescriptions     Medication Sig Dispense Start Date End Date Auth. Provider    naproxen (NAPROSYN) 500 MG tablet Take 1 tablet (500 mg total) by mouth 2 (two) times daily with meals. 30 tablet 12/7/2020  Yesenia Skaggs PA-C    lidocaine (LIDODERM) 5 % Place 1 patch onto the skin once daily. Remove & Discard patch within 12 hours or as directed by MD 15 patch 12/7/2020  Yesenia Skaggs PA-C    methocarbamoL (ROBAXIN) 500 MG Tab Take 2 tablets (1,000 mg total) by mouth 3 (three) times daily as needed (neck pain/ spasm). 20 tablet 12/7/2020 12/12/2020 Yesenia Skaggs PA-C        Follow-up Information     Follow up With Specialties Details Why Contact [facility] - Family Medicine Family Medicine Schedule an appointment as soon as possible for a visit in 1 week  [address] [facility] [address]  [phone]      [facility] Emergency Medicine Go to  If symptoms worsen [address] [facility] [id number] [phone]                                       Yesenia Skaggs PA-C  12/07/20 9575

## 2020-12-07 NOTE — ED NOTES
I-STAT Chem-8+ Results:   Value Reference Range   Sodium 138 136-145 mmol/L   Potassium  3.5 3.5-5.1 mmol/L   Chloride 103  mmol/L   Ionized Calcium 1.02 1.06-1.42 mmol/L   CO2 (measured) 27 23-29 mmol/L   Glucose 86  mg/dL   BUN 11 6-30 mg/dL   Creatinine 1.1 0.5-1.4 mg/dL   Hematocrit 42 36-54%

## 2020-12-07 NOTE — DISCHARGE INSTRUCTIONS
Diagnosis:  Syncope (fainting), neck pain    I suspect that you passed out tonight because had not eaten and were tired and dehydrated.  Your lab tests here in the ER and your EKG do not have any concerning findings.  Your X-ray does not show any broken bones in your neck.  I am prescribing an anti-inflammatory pain medicine as well as muscle relaxants and numbing patches.  Muscle relaxants may make you sleepy, do not take them if you are driving or drinking alcohol. You should take Tylenol in addition to this as needed for pain up to 3 grams daily which is 6 of the 500 mg extra strength tablets.    Please schedule a follow-up appointment with your primary care doctor to discuss your episode of passing out and your neck pain.  If you pass out again or if you have new symptoms such as chest pain, shortness of breath, confusion or dizziness please return to the emergency department immediately.

## 2021-03-01 ENCOUNTER — HOSPITAL ENCOUNTER (EMERGENCY)
Facility: HOSPITAL | Age: 36
Discharge: HOME | End: 2021-03-01
Attending: EMERGENCY MEDICINE
Payer: MEDICAID

## 2021-03-01 VITALS
DIASTOLIC BLOOD PRESSURE: 60 MMHG | SYSTOLIC BLOOD PRESSURE: 100 MMHG | RESPIRATION RATE: 14 BRPM | TEMPERATURE: 98 F | HEART RATE: 56 BPM | HEIGHT: 70 IN | WEIGHT: 172 LBS | OXYGEN SATURATION: 96 % | BODY MASS INDEX: 24.62 KG/M2

## 2021-03-01 DIAGNOSIS — M79.10 MYALGIA: ICD-10-CM

## 2021-03-01 DIAGNOSIS — R05.9 COUGH: ICD-10-CM

## 2021-03-01 DIAGNOSIS — R50.9 FEVER: ICD-10-CM

## 2021-03-01 DIAGNOSIS — B34.9 NONSPECIFIC SYNDROME SUGGESTIVE OF VIRAL ILLNESS: Primary | ICD-10-CM

## 2021-03-01 LAB
ALBUMIN SERPL BCP-MCNC: 3.7 G/DL (ref 3.5–5.2)
ALP SERPL-CCNC: 79 U/L (ref 55–135)
ALT SERPL W/O P-5'-P-CCNC: 14 U/L (ref 10–44)
ANION GAP SERPL CALC-SCNC: 9 MMOL/L (ref 8–16)
AST SERPL-CCNC: 24 U/L (ref 10–40)
BASOPHILS # BLD AUTO: 0.02 K/UL (ref 0–0.2)
BASOPHILS NFR BLD: 0.2 % (ref 0–1.9)
BILIRUB SERPL-MCNC: 0.3 MG/DL (ref 0.1–1)
BUN SERPL-MCNC: 11 MG/DL (ref 6–20)
CALCIUM SERPL-MCNC: 8.9 MG/DL (ref 8.7–10.5)
CHLORIDE SERPL-SCNC: 103 MMOL/L (ref 95–110)
CO2 SERPL-SCNC: 25 MMOL/L (ref 23–29)
CREAT SERPL-MCNC: 0.9 MG/DL (ref 0.5–1.4)
CTP QC/QA: YES
CTP QC/QA: YES
DIFFERENTIAL METHOD: ABNORMAL
EOSINOPHIL # BLD AUTO: 0.1 K/UL (ref 0–0.5)
EOSINOPHIL NFR BLD: 1.5 % (ref 0–8)
ERYTHROCYTE [DISTWIDTH] IN BLOOD BY AUTOMATED COUNT: 13.3 % (ref 11.5–14.5)
EST. GFR  (AFRICAN AMERICAN): >60 ML/MIN/1.73 M^2
EST. GFR  (NON AFRICAN AMERICAN): >60 ML/MIN/1.73 M^2
GLUCOSE SERPL-MCNC: 103 MG/DL (ref 70–110)
HCT VFR BLD AUTO: 43 % (ref 40–54)
HCV AB SERPL QL IA: NEGATIVE
HGB BLD-MCNC: 14.2 G/DL (ref 14–18)
IMM GRANULOCYTES # BLD AUTO: 0.03 K/UL (ref 0–0.04)
IMM GRANULOCYTES NFR BLD AUTO: 0.4 % (ref 0–0.5)
LYMPHOCYTES # BLD AUTO: 1.9 K/UL (ref 1–4.8)
LYMPHOCYTES NFR BLD: 21.6 % (ref 18–48)
MCH RBC QN AUTO: 34.4 PG (ref 27–31)
MCHC RBC AUTO-ENTMCNC: 33 G/DL (ref 32–36)
MCV RBC AUTO: 104 FL (ref 82–98)
MONOCYTES # BLD AUTO: 0.9 K/UL (ref 0.3–1)
MONOCYTES NFR BLD: 10.6 % (ref 4–15)
NEUTROPHILS # BLD AUTO: 5.6 K/UL (ref 1.8–7.7)
NEUTROPHILS NFR BLD: 65.7 % (ref 38–73)
NRBC BLD-RTO: 0 /100 WBC
PLATELET # BLD AUTO: 227 K/UL (ref 150–350)
PMV BLD AUTO: 8.6 FL (ref 9.2–12.9)
POC MOLECULAR INFLUENZA A AGN: NEGATIVE
POC MOLECULAR INFLUENZA B AGN: NEGATIVE
POTASSIUM SERPL-SCNC: 4 MMOL/L (ref 3.5–5.1)
PROT SERPL-MCNC: 7.2 G/DL (ref 6–8.4)
RBC # BLD AUTO: 4.13 M/UL (ref 4.6–6.2)
SARS-COV-2 RDRP RESP QL NAA+PROBE: NEGATIVE
SODIUM SERPL-SCNC: 137 MMOL/L (ref 136–145)
WBC # BLD AUTO: 8.56 K/UL (ref 3.9–12.7)

## 2021-03-01 PROCEDURE — 63600175 PHARM REV CODE 636 W HCPCS: Performed by: NURSE PRACTITIONER

## 2021-03-01 PROCEDURE — 99284 EMERGENCY DEPT VISIT MOD MDM: CPT | Mod: 25

## 2021-03-01 PROCEDURE — U0002 COVID-19 LAB TEST NON-CDC: HCPCS | Performed by: EMERGENCY MEDICINE

## 2021-03-01 PROCEDURE — 99284 PR EMERGENCY DEPT VISIT,LEVEL IV: ICD-10-PCS | Mod: CR,CS,, | Performed by: EMERGENCY MEDICINE

## 2021-03-01 PROCEDURE — 99284 EMERGENCY DEPT VISIT MOD MDM: CPT | Mod: CR,CS,, | Performed by: EMERGENCY MEDICINE

## 2021-03-01 PROCEDURE — 80053 COMPREHEN METABOLIC PANEL: CPT

## 2021-03-01 PROCEDURE — 86703 HIV-1/HIV-2 1 RESULT ANTBDY: CPT

## 2021-03-01 PROCEDURE — 87502 INFLUENZA DNA AMP PROBE: CPT

## 2021-03-01 PROCEDURE — 86803 HEPATITIS C AB TEST: CPT

## 2021-03-01 PROCEDURE — 85025 COMPLETE CBC W/AUTO DIFF WBC: CPT

## 2021-03-01 PROCEDURE — 96361 HYDRATE IV INFUSION ADD-ON: CPT

## 2021-03-01 PROCEDURE — 96375 TX/PRO/DX INJ NEW DRUG ADDON: CPT

## 2021-03-01 PROCEDURE — 96374 THER/PROPH/DIAG INJ IV PUSH: CPT

## 2021-03-01 RX ORDER — CETIRIZINE HYDROCHLORIDE, PSEUDOEPHEDRINE HYDROCHLORIDE 5; 120 MG/1; MG/1
1 TABLET, FILM COATED, EXTENDED RELEASE ORAL DAILY
Qty: 30 TABLET | Refills: 0 | Status: SHIPPED | OUTPATIENT
Start: 2021-03-01 | End: 2021-03-11

## 2021-03-01 RX ORDER — DEXAMETHASONE SODIUM PHOSPHATE 4 MG/ML
8 INJECTION, SOLUTION INTRA-ARTICULAR; INTRALESIONAL; INTRAMUSCULAR; INTRAVENOUS; SOFT TISSUE
Status: COMPLETED | OUTPATIENT
Start: 2021-03-01 | End: 2021-03-01

## 2021-03-01 RX ORDER — KETOROLAC TROMETHAMINE 30 MG/ML
15 INJECTION, SOLUTION INTRAMUSCULAR; INTRAVENOUS
Status: COMPLETED | OUTPATIENT
Start: 2021-03-01 | End: 2021-03-01

## 2021-03-01 RX ORDER — FLUTICASONE PROPIONATE 50 MCG
1 SPRAY, SUSPENSION (ML) NASAL 2 TIMES DAILY PRN
Qty: 15 G | Refills: 0 | Status: SHIPPED | OUTPATIENT
Start: 2021-03-01

## 2021-03-01 RX ORDER — BENZONATATE 100 MG/1
100 CAPSULE ORAL 3 TIMES DAILY PRN
Qty: 20 CAPSULE | Refills: 0 | Status: SHIPPED | OUTPATIENT
Start: 2021-03-01 | End: 2021-03-11

## 2021-03-01 RX ADMIN — DEXAMETHASONE SODIUM PHOSPHATE 8 MG: 4 INJECTION INTRA-ARTICULAR; INTRALESIONAL; INTRAMUSCULAR; INTRAVENOUS; SOFT TISSUE at 02:03

## 2021-03-01 RX ADMIN — SODIUM CHLORIDE, SODIUM LACTATE, POTASSIUM CHLORIDE, AND CALCIUM CHLORIDE 1000 ML: .6; .31; .03; .02 INJECTION, SOLUTION INTRAVENOUS at 12:03

## 2021-03-01 RX ADMIN — KETOROLAC TROMETHAMINE 15 MG: 30 INJECTION, SOLUTION INTRAMUSCULAR; INTRAVENOUS at 12:03

## 2021-03-03 LAB — HIV 1+2 AB+HIV1 P24 AG SERPL QL IA: NEGATIVE

## 2021-04-02 ENCOUNTER — HOSPITAL ENCOUNTER (EMERGENCY)
Facility: HOSPITAL | Age: 36
Discharge: HOME | End: 2021-04-02
Attending: EMERGENCY MEDICINE
Payer: MEDICAID

## 2021-04-02 VITALS
WEIGHT: 172 LBS | HEIGHT: 70 IN | OXYGEN SATURATION: 100 % | SYSTOLIC BLOOD PRESSURE: 110 MMHG | HEART RATE: 62 BPM | RESPIRATION RATE: 18 BRPM | TEMPERATURE: 98 F | BODY MASS INDEX: 24.62 KG/M2 | DIASTOLIC BLOOD PRESSURE: 68 MMHG

## 2021-04-02 DIAGNOSIS — R11.2 NAUSEA VOMITING AND DIARRHEA: ICD-10-CM

## 2021-04-02 DIAGNOSIS — R19.7 NAUSEA VOMITING AND DIARRHEA: ICD-10-CM

## 2021-04-02 DIAGNOSIS — R10.33 PERIUMBILICAL ABDOMINAL PAIN: Primary | ICD-10-CM

## 2021-04-02 LAB
ALBUMIN SERPL BCP-MCNC: 3.8 G/DL (ref 3.5–5.2)
ALP SERPL-CCNC: 67 U/L (ref 55–135)
ALT SERPL W/O P-5'-P-CCNC: 20 U/L (ref 10–44)
ANION GAP SERPL CALC-SCNC: 9 MMOL/L (ref 8–16)
AST SERPL-CCNC: 30 U/L (ref 10–40)
BASOPHILS # BLD AUTO: 0.03 K/UL (ref 0–0.2)
BASOPHILS NFR BLD: 0.3 % (ref 0–1.9)
BILIRUB SERPL-MCNC: 0.3 MG/DL (ref 0.1–1)
BILIRUB UR QL STRIP: NEGATIVE
BUN SERPL-MCNC: 14 MG/DL (ref 6–20)
CALCIUM SERPL-MCNC: 9.1 MG/DL (ref 8.7–10.5)
CHLORIDE SERPL-SCNC: 102 MMOL/L (ref 95–110)
CLARITY UR REFRACT.AUTO: CLEAR
CO2 SERPL-SCNC: 29 MMOL/L (ref 23–29)
COLOR UR AUTO: YELLOW
CREAT SERPL-MCNC: 1 MG/DL (ref 0.5–1.4)
DIFFERENTIAL METHOD: ABNORMAL
EOSINOPHIL # BLD AUTO: 0.1 K/UL (ref 0–0.5)
EOSINOPHIL NFR BLD: 0.7 % (ref 0–8)
ERYTHROCYTE [DISTWIDTH] IN BLOOD BY AUTOMATED COUNT: 12.7 % (ref 11.5–14.5)
EST. GFR  (AFRICAN AMERICAN): >60 ML/MIN/1.73 M^2
EST. GFR  (NON AFRICAN AMERICAN): >60 ML/MIN/1.73 M^2
GLUCOSE SERPL-MCNC: 101 MG/DL (ref 70–110)
GLUCOSE UR QL STRIP: NEGATIVE
HCT VFR BLD AUTO: 44.2 % (ref 40–54)
HGB BLD-MCNC: 14.6 G/DL (ref 14–18)
HGB UR QL STRIP: NEGATIVE
IMM GRANULOCYTES # BLD AUTO: 0.03 K/UL (ref 0–0.04)
IMM GRANULOCYTES NFR BLD AUTO: 0.3 % (ref 0–0.5)
INR PPP: 0.9 (ref 0.8–1.2)
KETONES UR QL STRIP: NEGATIVE
LEUKOCYTE ESTERASE UR QL STRIP: NEGATIVE
LIPASE SERPL-CCNC: 52 U/L (ref 4–60)
LYMPHOCYTES # BLD AUTO: 1.7 K/UL (ref 1–4.8)
LYMPHOCYTES NFR BLD: 16 % (ref 18–48)
MCH RBC QN AUTO: 34.9 PG (ref 27–31)
MCHC RBC AUTO-ENTMCNC: 33 G/DL (ref 32–36)
MCV RBC AUTO: 106 FL (ref 82–98)
MONOCYTES # BLD AUTO: 1.1 K/UL (ref 0.3–1)
MONOCYTES NFR BLD: 10.1 % (ref 4–15)
NEUTROPHILS # BLD AUTO: 7.9 K/UL (ref 1.8–7.7)
NEUTROPHILS NFR BLD: 72.6 % (ref 38–73)
NITRITE UR QL STRIP: NEGATIVE
NRBC BLD-RTO: 0 /100 WBC
PH UR STRIP: 6 [PH] (ref 5–8)
PLATELET # BLD AUTO: 213 K/UL (ref 150–450)
PMV BLD AUTO: 9 FL (ref 9.2–12.9)
POTASSIUM SERPL-SCNC: 3.9 MMOL/L (ref 3.5–5.1)
PROT SERPL-MCNC: 7.2 G/DL (ref 6–8.4)
PROT UR QL STRIP: NEGATIVE
PROTHROMBIN TIME: 9.8 SEC (ref 9–12.5)
RBC # BLD AUTO: 4.18 M/UL (ref 4.6–6.2)
SODIUM SERPL-SCNC: 140 MMOL/L (ref 136–145)
SP GR UR STRIP: 1.02 (ref 1–1.03)
URN SPEC COLLECT METH UR: NORMAL
WBC # BLD AUTO: 10.82 K/UL (ref 3.9–12.7)

## 2021-04-02 PROCEDURE — 85025 COMPLETE CBC W/AUTO DIFF WBC: CPT | Performed by: PHYSICIAN ASSISTANT

## 2021-04-02 PROCEDURE — 85610 PROTHROMBIN TIME: CPT | Performed by: PHYSICIAN ASSISTANT

## 2021-04-02 PROCEDURE — 25500020 PHARM REV CODE 255: Performed by: EMERGENCY MEDICINE

## 2021-04-02 PROCEDURE — 96361 HYDRATE IV INFUSION ADD-ON: CPT

## 2021-04-02 PROCEDURE — 63600175 PHARM REV CODE 636 W HCPCS: Performed by: PHYSICIAN ASSISTANT

## 2021-04-02 PROCEDURE — 81003 URINALYSIS AUTO W/O SCOPE: CPT | Performed by: PHYSICIAN ASSISTANT

## 2021-04-02 PROCEDURE — 99284 PR EMERGENCY DEPT VISIT,LEVEL IV: ICD-10-PCS | Mod: ,,, | Performed by: PHYSICIAN ASSISTANT

## 2021-04-02 PROCEDURE — 96375 TX/PRO/DX INJ NEW DRUG ADDON: CPT

## 2021-04-02 PROCEDURE — 96374 THER/PROPH/DIAG INJ IV PUSH: CPT

## 2021-04-02 PROCEDURE — 99284 EMERGENCY DEPT VISIT MOD MDM: CPT | Mod: ,,, | Performed by: PHYSICIAN ASSISTANT

## 2021-04-02 PROCEDURE — 25000003 PHARM REV CODE 250: Performed by: PHYSICIAN ASSISTANT

## 2021-04-02 PROCEDURE — 83690 ASSAY OF LIPASE: CPT | Performed by: PHYSICIAN ASSISTANT

## 2021-04-02 PROCEDURE — 99285 EMERGENCY DEPT VISIT HI MDM: CPT | Mod: 25

## 2021-04-02 PROCEDURE — 80053 COMPREHEN METABOLIC PANEL: CPT | Performed by: PHYSICIAN ASSISTANT

## 2021-04-02 RX ORDER — MAG HYDROX/ALUMINUM HYD/SIMETH 200-200-20
30 SUSPENSION, ORAL (FINAL DOSE FORM) ORAL
Status: DISCONTINUED | OUTPATIENT
Start: 2021-04-02 | End: 2021-04-02 | Stop reason: HOSPADM

## 2021-04-02 RX ORDER — FAMOTIDINE 20 MG/1
20 TABLET, FILM COATED ORAL 2 TIMES DAILY
Qty: 20 TABLET | Refills: 0 | Status: SHIPPED | OUTPATIENT
Start: 2021-04-02 | End: 2021-04-12

## 2021-04-02 RX ORDER — MORPHINE SULFATE 4 MG/ML
4 INJECTION, SOLUTION INTRAMUSCULAR; INTRAVENOUS
Status: COMPLETED | OUTPATIENT
Start: 2021-04-02 | End: 2021-04-02

## 2021-04-02 RX ORDER — LOPERAMIDE HYDROCHLORIDE 2 MG/1
2 CAPSULE ORAL 4 TIMES DAILY PRN
Qty: 12 CAPSULE | Refills: 0 | Status: SHIPPED | OUTPATIENT
Start: 2021-04-02 | End: 2021-04-12

## 2021-04-02 RX ORDER — ONDANSETRON 2 MG/ML
4 INJECTION INTRAMUSCULAR; INTRAVENOUS
Status: COMPLETED | OUTPATIENT
Start: 2021-04-02 | End: 2021-04-02

## 2021-04-02 RX ORDER — FAMOTIDINE 10 MG/ML
20 INJECTION INTRAVENOUS
Status: COMPLETED | OUTPATIENT
Start: 2021-04-02 | End: 2021-04-02

## 2021-04-02 RX ORDER — ONDANSETRON 4 MG/1
4 TABLET, ORALLY DISINTEGRATING ORAL EVERY 6 HOURS PRN
Qty: 10 TABLET | Refills: 0 | Status: SHIPPED | OUTPATIENT
Start: 2021-04-02

## 2021-04-02 RX ORDER — LIDOCAINE HYDROCHLORIDE 20 MG/ML
10 SOLUTION OROPHARYNGEAL
Status: COMPLETED | OUTPATIENT
Start: 2021-04-02 | End: 2021-04-02

## 2021-04-02 RX ADMIN — FAMOTIDINE 20 MG: 10 INJECTION INTRAVENOUS at 08:04

## 2021-04-02 RX ADMIN — ONDANSETRON 4 MG: 2 INJECTION INTRAMUSCULAR; INTRAVENOUS at 08:04

## 2021-04-02 RX ADMIN — LIDOCAINE HYDROCHLORIDE 10 ML: 20 SOLUTION ORAL; TOPICAL at 11:04

## 2021-04-02 RX ADMIN — SODIUM CHLORIDE 1000 ML: 0.9 INJECTION, SOLUTION INTRAVENOUS at 07:04

## 2021-04-02 RX ADMIN — ALUMINUM HYDROXIDE, MAGNESIUM HYDROXIDE, AND SIMETHICONE 30 ML: 200; 200; 20 SUSPENSION ORAL at 12:04

## 2021-04-02 RX ADMIN — IOHEXOL 75 ML: 300 INJECTION, SOLUTION INTRAVENOUS at 10:04

## 2021-04-02 RX ADMIN — MORPHINE SULFATE 4 MG: 4 INJECTION INTRAVENOUS at 08:04

## 2021-07-02 ENCOUNTER — HOSPITAL ENCOUNTER (EMERGENCY)
Facility: HOSPITAL | Age: 36
Discharge: HOME | End: 2021-07-02
Attending: EMERGENCY MEDICINE
Payer: MEDICAID

## 2021-07-02 VITALS
TEMPERATURE: 98 F | HEART RATE: 65 BPM | HEIGHT: 69 IN | SYSTOLIC BLOOD PRESSURE: 119 MMHG | RESPIRATION RATE: 16 BRPM | WEIGHT: 172 LBS | DIASTOLIC BLOOD PRESSURE: 69 MMHG | BODY MASS INDEX: 25.48 KG/M2 | OXYGEN SATURATION: 100 %

## 2021-07-02 DIAGNOSIS — R55 SYNCOPE: ICD-10-CM

## 2021-07-02 DIAGNOSIS — Z77.098 CHEMICAL EXPOSURE: Primary | ICD-10-CM

## 2021-07-02 PROCEDURE — 99284 PR EMERGENCY DEPT VISIT,LEVEL IV: ICD-10-PCS | Mod: ,,, | Performed by: EMERGENCY MEDICINE

## 2021-07-02 PROCEDURE — 93005 ELECTROCARDIOGRAM TRACING: CPT

## 2021-07-02 PROCEDURE — 99283 EMERGENCY DEPT VISIT LOW MDM: CPT | Mod: 25

## 2021-07-02 PROCEDURE — 93010 ELECTROCARDIOGRAM REPORT: CPT | Mod: ,,, | Performed by: INTERNAL MEDICINE

## 2021-07-02 PROCEDURE — 99284 EMERGENCY DEPT VISIT MOD MDM: CPT | Mod: ,,, | Performed by: EMERGENCY MEDICINE

## 2021-07-02 PROCEDURE — 93010 EKG 12-LEAD: ICD-10-PCS | Mod: ,,, | Performed by: INTERNAL MEDICINE
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
## 2021-08-21 ENCOUNTER — HOSPITAL ENCOUNTER (EMERGENCY)
Facility: HOSPITAL | Age: 36
Discharge: HOME | End: 2021-08-21
Attending: EMERGENCY MEDICINE
Payer: MEDICAID

## 2021-08-21 VITALS
OXYGEN SATURATION: 100 % | RESPIRATION RATE: 16 BRPM | HEART RATE: 58 BPM | SYSTOLIC BLOOD PRESSURE: 96 MMHG | WEIGHT: 169 LBS | TEMPERATURE: 99 F | DIASTOLIC BLOOD PRESSURE: 56 MMHG | BODY MASS INDEX: 24.96 KG/M2

## 2021-08-21 DIAGNOSIS — G43.809 OTHER MIGRAINE WITHOUT STATUS MIGRAINOSUS, NOT INTRACTABLE: Primary | ICD-10-CM

## 2021-08-21 LAB
CTP QC/QA: YES
SARS-COV-2 RDRP RESP QL NAA+PROBE: NEGATIVE

## 2021-08-21 PROCEDURE — 99284 PR EMERGENCY DEPT VISIT,LEVEL IV: ICD-10-PCS | Mod: CS,,, | Performed by: EMERGENCY MEDICINE

## 2021-08-21 PROCEDURE — 99284 EMERGENCY DEPT VISIT MOD MDM: CPT | Mod: CS,,, | Performed by: EMERGENCY MEDICINE

## 2021-08-21 PROCEDURE — 96361 HYDRATE IV INFUSION ADD-ON: CPT

## 2021-08-21 PROCEDURE — 96374 THER/PROPH/DIAG INJ IV PUSH: CPT

## 2021-08-21 PROCEDURE — U0002 COVID-19 LAB TEST NON-CDC: HCPCS | Performed by: EMERGENCY MEDICINE

## 2021-08-21 PROCEDURE — 25000003 PHARM REV CODE 250: Performed by: EMERGENCY MEDICINE

## 2021-08-21 PROCEDURE — 96375 TX/PRO/DX INJ NEW DRUG ADDON: CPT

## 2021-08-21 PROCEDURE — 99284 EMERGENCY DEPT VISIT MOD MDM: CPT | Mod: 25

## 2021-08-21 PROCEDURE — 63600175 PHARM REV CODE 636 W HCPCS: Performed by: EMERGENCY MEDICINE

## 2021-08-21 RX ORDER — PROPARACAINE HYDROCHLORIDE 5 MG/ML
1 SOLUTION/ DROPS OPHTHALMIC
Status: COMPLETED | OUTPATIENT
Start: 2021-08-21 | End: 2021-08-21

## 2021-08-21 RX ORDER — KETOROLAC TROMETHAMINE 30 MG/ML
15 INJECTION, SOLUTION INTRAMUSCULAR; INTRAVENOUS
Status: COMPLETED | OUTPATIENT
Start: 2021-08-21 | End: 2021-08-21

## 2021-08-21 RX ORDER — METOCLOPRAMIDE HYDROCHLORIDE 5 MG/ML
5 INJECTION INTRAMUSCULAR; INTRAVENOUS
Status: COMPLETED | OUTPATIENT
Start: 2021-08-21 | End: 2021-08-21

## 2021-08-21 RX ORDER — DIPHENHYDRAMINE HYDROCHLORIDE 50 MG/ML
12.5 INJECTION INTRAMUSCULAR; INTRAVENOUS
Status: COMPLETED | OUTPATIENT
Start: 2021-08-21 | End: 2021-08-21

## 2021-08-21 RX ORDER — DEXAMETHASONE SODIUM PHOSPHATE 4 MG/ML
8 INJECTION, SOLUTION INTRA-ARTICULAR; INTRALESIONAL; INTRAMUSCULAR; INTRAVENOUS; SOFT TISSUE
Status: COMPLETED | OUTPATIENT
Start: 2021-08-21 | End: 2021-08-21

## 2021-08-21 RX ADMIN — PROPARACAINE HYDROCHLORIDE 1 DROP: 5 SOLUTION/ DROPS OPHTHALMIC at 04:08

## 2021-08-21 RX ADMIN — FLUORESCEIN SODIUM 1 EACH: 1 STRIP OPHTHALMIC at 04:08

## 2021-08-21 RX ADMIN — DIPHENHYDRAMINE HYDROCHLORIDE 12.5 MG: 50 INJECTION, SOLUTION INTRAMUSCULAR; INTRAVENOUS at 04:08

## 2021-08-21 RX ADMIN — SODIUM CHLORIDE 1000 ML: 0.9 INJECTION, SOLUTION INTRAVENOUS at 04:08

## 2021-08-21 RX ADMIN — DEXAMETHASONE SODIUM PHOSPHATE 8 MG: 4 INJECTION INTRA-ARTICULAR; INTRALESIONAL; INTRAMUSCULAR; INTRAVENOUS; SOFT TISSUE at 06:08

## 2021-08-21 RX ADMIN — KETOROLAC TROMETHAMINE 15 MG: 30 INJECTION, SOLUTION INTRAMUSCULAR; INTRAVENOUS at 06:08

## 2021-08-21 RX ADMIN — METOCLOPRAMIDE 5 MG: 5 INJECTION, SOLUTION INTRAMUSCULAR; INTRAVENOUS at 04:08

## 2023-03-27 ENCOUNTER — HOSPITAL ENCOUNTER (EMERGENCY)
Facility: HOSPITAL | Age: 38
Discharge: HOME | End: 2023-03-27
Attending: EMERGENCY MEDICINE
Payer: MEDICAID

## 2023-03-27 VITALS
DIASTOLIC BLOOD PRESSURE: 77 MMHG | WEIGHT: 164 LBS | RESPIRATION RATE: 18 BRPM | SYSTOLIC BLOOD PRESSURE: 144 MMHG | TEMPERATURE: 99 F | BODY MASS INDEX: 23.48 KG/M2 | HEIGHT: 70 IN | HEART RATE: 72 BPM | OXYGEN SATURATION: 99 %

## 2023-03-27 DIAGNOSIS — S63.509A WRIST SPRAIN: ICD-10-CM

## 2023-03-27 PROCEDURE — 29125 APPL SHORT ARM SPLINT STATIC: CPT

## 2023-03-27 PROCEDURE — 99283 EMERGENCY DEPT VISIT LOW MDM: CPT

## 2023-03-27 PROCEDURE — 99283 EMERGENCY DEPT VISIT LOW MDM: CPT | Mod: ,,, | Performed by: PHYSICIAN ASSISTANT

## 2023-03-27 PROCEDURE — 99283 PR EMERGENCY DEPT VISIT,LEVEL III: ICD-10-PCS | Mod: ,,, | Performed by: PHYSICIAN ASSISTANT

## 2023-03-27 RX ORDER — NAPROXEN 500 MG/1
500 TABLET ORAL 2 TIMES DAILY WITH MEALS
Qty: 10 TABLET | Refills: 0 | Status: SHIPPED | OUTPATIENT
Start: 2023-03-27

## 2023-03-27 NOTE — DISCHARGE INSTRUCTIONS
Your x-rays did not show any broken bones.  Your symptoms are likely related to a sprain.  Take the naproxen 500 mg every 12 hours with food as needed for pain.  DO NOT take any additional Aleve, naproxen, ibuprofen (Advil, Motrin) while taking this medication.  You can also take acetaminophen/tylenol 650 mg every 6 hours or 1000 mg every 8 hours for added relief.  You can apply ice to the area for 10 minutes to reduce pain.  You can repeat this 3 to 4 times a day.  Follow up in primary clinic if your symptoms do not improve.   Return to the ER for new or worsening symptoms such as numbness, swelling or any other new or worsening symptoms or other urgent concerns.
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
## 2024-05-04 ENCOUNTER — HOSPITAL ENCOUNTER (EMERGENCY)
Facility: HOSPITAL | Age: 39
Discharge: PSYCHIATRIC OR SUBSTANCE USE FACILITY | End: 2024-05-04
Attending: EMERGENCY MEDICINE
Payer: MEDICAID

## 2024-05-04 VITALS
RESPIRATION RATE: 19 BRPM | HEIGHT: 69 IN | DIASTOLIC BLOOD PRESSURE: 75 MMHG | TEMPERATURE: 99 F | WEIGHT: 165 LBS | OXYGEN SATURATION: 100 % | SYSTOLIC BLOOD PRESSURE: 114 MMHG | BODY MASS INDEX: 24.44 KG/M2 | HEART RATE: 60 BPM

## 2024-05-04 DIAGNOSIS — R56.9 WITNESSED SEIZURE-LIKE ACTIVITY: ICD-10-CM

## 2024-05-04 DIAGNOSIS — S36.119A: Primary | ICD-10-CM

## 2024-05-04 DIAGNOSIS — E83.39 HYPOPHOSPHATEMIA: ICD-10-CM

## 2024-05-04 LAB
ALBUMIN SERPL BCP-MCNC: 3.7 G/DL (ref 3.5–5.2)
ALP SERPL-CCNC: 54 U/L (ref 55–135)
ALT SERPL W/O P-5'-P-CCNC: 90 U/L (ref 10–44)
AMPHET+METHAMPHET UR QL: NEGATIVE
ANION GAP SERPL CALC-SCNC: 5 MMOL/L (ref 8–16)
APAP SERPL-MCNC: <3 UG/ML (ref 10–20)
AST SERPL-CCNC: 407 U/L (ref 10–40)
BARBITURATES UR QL SCN>200 NG/ML: NEGATIVE
BASOPHILS # BLD AUTO: 0.02 K/UL (ref 0–0.2)
BASOPHILS NFR BLD: 0.3 % (ref 0–1.9)
BENZODIAZ UR QL SCN>200 NG/ML: NEGATIVE
BILIRUB SERPL-MCNC: 0.4 MG/DL (ref 0.1–1)
BILIRUB UR QL STRIP: NEGATIVE
BUN SERPL-MCNC: 5 MG/DL (ref 6–20)
BZE UR QL SCN: NEGATIVE
CALCIUM SERPL-MCNC: 9.1 MG/DL (ref 8.7–10.5)
CANNABINOIDS UR QL SCN: ABNORMAL
CHLORIDE SERPL-SCNC: 105 MMOL/L (ref 95–110)
CLARITY UR REFRACT.AUTO: CLEAR
CO2 SERPL-SCNC: 28 MMOL/L (ref 23–29)
COLOR UR AUTO: ABNORMAL
CREAT SERPL-MCNC: 0.8 MG/DL (ref 0.5–1.4)
CREAT UR-MCNC: 35 MG/DL (ref 23–375)
DIFFERENTIAL METHOD BLD: ABNORMAL
EOSINOPHIL # BLD AUTO: 0.1 K/UL (ref 0–0.5)
EOSINOPHIL NFR BLD: 1.2 % (ref 0–8)
ERYTHROCYTE [DISTWIDTH] IN BLOOD BY AUTOMATED COUNT: 12.5 % (ref 11.5–14.5)
EST. GFR  (NO RACE VARIABLE): >60 ML/MIN/1.73 M^2
ETHANOL SERPL-MCNC: <10 MG/DL
GLUCOSE SERPL-MCNC: 90 MG/DL (ref 70–110)
GLUCOSE UR QL STRIP: NEGATIVE
HCT VFR BLD AUTO: 39.3 % (ref 40–54)
HCV AB SERPL QL IA: NORMAL
HGB BLD-MCNC: 13.1 G/DL (ref 14–18)
HGB UR QL STRIP: ABNORMAL
HIV 1+2 AB+HIV1 P24 AG SERPL QL IA: NORMAL
IMM GRANULOCYTES # BLD AUTO: 0.02 K/UL (ref 0–0.04)
IMM GRANULOCYTES NFR BLD AUTO: 0.3 % (ref 0–0.5)
INR PPP: 1 (ref 0.8–1.2)
KETONES UR QL STRIP: NEGATIVE
LACTATE SERPL-SCNC: 1.2 MMOL/L (ref 0.5–2.2)
LEUKOCYTE ESTERASE UR QL STRIP: NEGATIVE
LYMPHOCYTES # BLD AUTO: 2.3 K/UL (ref 1–4.8)
LYMPHOCYTES NFR BLD: 30.3 % (ref 18–48)
MAGNESIUM SERPL-MCNC: 2.1 MG/DL (ref 1.6–2.6)
MCH RBC QN AUTO: 33.3 PG (ref 27–31)
MCHC RBC AUTO-ENTMCNC: 33.3 G/DL (ref 32–36)
MCV RBC AUTO: 100 FL (ref 82–98)
METHADONE UR QL SCN>300 NG/ML: NEGATIVE
MICROSCOPIC COMMENT: NORMAL
MONOCYTES # BLD AUTO: 0.6 K/UL (ref 0.3–1)
MONOCYTES NFR BLD: 8.4 % (ref 4–15)
NEUTROPHILS # BLD AUTO: 4.4 K/UL (ref 1.8–7.7)
NEUTROPHILS NFR BLD: 59.5 % (ref 38–73)
NITRITE UR QL STRIP: NEGATIVE
NRBC BLD-RTO: 0 /100 WBC
OPIATES UR QL SCN: NEGATIVE
PCP UR QL SCN>25 NG/ML: NEGATIVE
PH UR STRIP: 8 [PH] (ref 5–8)
PHOSPHATE SERPL-MCNC: 2.5 MG/DL (ref 2.7–4.5)
PLATELET # BLD AUTO: 250 K/UL (ref 150–450)
PMV BLD AUTO: 8.7 FL (ref 9.2–12.9)
POTASSIUM SERPL-SCNC: 3.8 MMOL/L (ref 3.5–5.1)
PROT SERPL-MCNC: 6.7 G/DL (ref 6–8.4)
PROT UR QL STRIP: NEGATIVE
PROTHROMBIN TIME: 10.6 SEC (ref 9–12.5)
RBC # BLD AUTO: 3.93 M/UL (ref 4.6–6.2)
RBC #/AREA URNS AUTO: 1 /HPF (ref 0–4)
SODIUM SERPL-SCNC: 138 MMOL/L (ref 136–145)
SP GR UR STRIP: 1 (ref 1–1.03)
TOXICOLOGY INFORMATION: ABNORMAL
TSH SERPL DL<=0.005 MIU/L-ACNC: 0.74 UIU/ML (ref 0.4–4)
URN SPEC COLLECT METH UR: ABNORMAL
WBC # BLD AUTO: 7.42 K/UL (ref 3.9–12.7)
WBC #/AREA URNS AUTO: 2 /HPF (ref 0–5)

## 2024-05-04 PROCEDURE — 83605 ASSAY OF LACTIC ACID: CPT | Performed by: EMERGENCY MEDICINE

## 2024-05-04 PROCEDURE — 84100 ASSAY OF PHOSPHORUS: CPT | Performed by: EMERGENCY MEDICINE

## 2024-05-04 PROCEDURE — 87389 HIV-1 AG W/HIV-1&-2 AB AG IA: CPT | Performed by: PHYSICIAN ASSISTANT

## 2024-05-04 PROCEDURE — 99285 EMERGENCY DEPT VISIT HI MDM: CPT | Mod: 25

## 2024-05-04 PROCEDURE — 80307 DRUG TEST PRSMV CHEM ANLYZR: CPT | Performed by: EMERGENCY MEDICINE

## 2024-05-04 PROCEDURE — 82077 ASSAY SPEC XCP UR&BREATH IA: CPT | Performed by: EMERGENCY MEDICINE

## 2024-05-04 PROCEDURE — 25000003 PHARM REV CODE 250: Performed by: EMERGENCY MEDICINE

## 2024-05-04 PROCEDURE — 83735 ASSAY OF MAGNESIUM: CPT | Performed by: EMERGENCY MEDICINE

## 2024-05-04 PROCEDURE — 85610 PROTHROMBIN TIME: CPT | Performed by: EMERGENCY MEDICINE

## 2024-05-04 PROCEDURE — 86803 HEPATITIS C AB TEST: CPT | Performed by: PHYSICIAN ASSISTANT

## 2024-05-04 PROCEDURE — 96360 HYDRATION IV INFUSION INIT: CPT

## 2024-05-04 PROCEDURE — 84443 ASSAY THYROID STIM HORMONE: CPT | Performed by: EMERGENCY MEDICINE

## 2024-05-04 PROCEDURE — 85025 COMPLETE CBC W/AUTO DIFF WBC: CPT | Performed by: EMERGENCY MEDICINE

## 2024-05-04 PROCEDURE — 80053 COMPREHEN METABOLIC PANEL: CPT | Performed by: EMERGENCY MEDICINE

## 2024-05-04 PROCEDURE — 96361 HYDRATE IV INFUSION ADD-ON: CPT

## 2024-05-04 PROCEDURE — 81001 URINALYSIS AUTO W/SCOPE: CPT | Mod: XB | Performed by: EMERGENCY MEDICINE

## 2024-05-04 PROCEDURE — 80143 DRUG ASSAY ACETAMINOPHEN: CPT | Performed by: EMERGENCY MEDICINE

## 2024-05-04 PROCEDURE — 63600175 PHARM REV CODE 636 W HCPCS: Performed by: EMERGENCY MEDICINE

## 2024-05-04 RX ADMIN — SODIUM CHLORIDE, POTASSIUM CHLORIDE, SODIUM LACTATE AND CALCIUM CHLORIDE 1000 ML: 600; 310; 30; 20 INJECTION, SOLUTION INTRAVENOUS at 05:05

## 2024-05-04 RX ADMIN — SODIUM CHLORIDE, POTASSIUM CHLORIDE, SODIUM LACTATE AND CALCIUM CHLORIDE 1000 ML: 600; 310; 30; 20 INJECTION, SOLUTION INTRAVENOUS at 02:05

## 2024-05-04 RX ADMIN — DIBASIC SODIUM PHOSPHATE, MONOBASIC POTASSIUM PHOSPHATE AND MONOBASIC SODIUM PHOSPHATE 2 TABLET: 852; 155; 130 TABLET ORAL at 04:05

## 2024-05-04 NOTE — ED PROVIDER NOTES
Encounter Date: 5/4/2024       History     Chief Complaint   Patient presents with    Seizures     Voluntary Admit at [facility]/ EMS reports witnessed seizure    Withdrawal     38-year-old man with comorbidities of alcohol, fentanyl, and methamphetamine use, and previous liver injury presents from voluntary inpatient psychiatric unit/[facility] to the ED for evaluation of seizure-like activity witnessed by facility staff earlier today.  Also, the patient has been administered 2 mg of Ativan intramuscularly by the [facility] clinical staff due to seizure activity.  At the time of my exam, the patient is notably somnolent, though arousable to conversant.  He denies any associated recent history of trauma or recent drug use as well as any previous diagnosis of seizure disorder.    The history is provided by the patient and medical records. The history is limited by the condition of the patient. No  was used.     Review of patient's allergies indicates:  No Known Allergies  Past Medical History:   Diagnosis Date    Alcohol abuse     Asthma     Back muscle spasm     Back pain     Chest pain     Drug abuse     Liver disease     Pancreatitis      No past surgical history on file.  Family History   Problem Relation Name Age of Onset    Diabetes Mother       Social History     Tobacco Use    Smoking status: Some Days     Current packs/day: 0.25     Types: Cigarettes    Smokeless tobacco: Never    Tobacco comments:     Approximately 1 pack every 2-3 days   Substance Use Topics    Alcohol use: Yes     Alcohol/week: 1.0 - 2.0 standard drink of alcohol     Types: 1 - 2 Standard drinks or equivalent per week     Comment: 3-4 days per week ; generally liquor    Drug use: Yes     Types: Marijuana, Cocaine     Comment: marijuana 3x weekly and cocaine occasionally     Review of Systems    Physical Exam     Initial Vitals [05/04/24 1306]   BP Pulse Resp Temp SpO2   111/63 64 16 98.7 °F (37.1 °C) 99 %      MAP        --         Physical Exam    Vitals reviewed.  Constitutional:   38-year-old  man, no evidence of acute distress noted, somnolent, arousable to conversant   HENT:   Head: Normocephalic and atraumatic.   Mouth/Throat: Oropharynx is clear and moist.   Eyes: EOM are normal. Pupils are equal, round, and reactive to light.   Neck: No tracheal deviation present.   Cardiovascular:  Normal rate, regular rhythm and intact distal pulses.           Pulmonary/Chest: Breath sounds normal. No stridor. No respiratory distress.   Abdominal: Abdomen is soft. He exhibits no distension. There is no abdominal tenderness. There is no rebound.   Musculoskeletal:         General: No edema. Normal range of motion.      Comments: No external evidence of trauma/deformity noted     Neurological:   Patient is somnolent, arousable with tactile stimulation to conversant and appropriate though returns to sleep without continuous stimulation; no focal evidence of neurologic deficit   Skin: Skin is warm and dry.   Psychiatric:   Somnolent, cooperative         ED Course   Procedures  Labs Reviewed   CBC W/ AUTO DIFFERENTIAL - Abnormal; Notable for the following components:       Result Value    RBC 3.93 (*)     Hemoglobin 13.1 (*)     Hematocrit 39.3 (*)      (*)     MCH 33.3 (*)     MPV 8.7 (*)     All other components within normal limits    Narrative:     Release to patient->Immediate   COMPREHENSIVE METABOLIC PANEL - Abnormal; Notable for the following components:    BUN 5 (*)     Alkaline Phosphatase 54 (*)      (*)     ALT 90 (*)     Anion Gap 5 (*)     All other components within normal limits    Narrative:     Release to patient->Immediate   URINALYSIS, REFLEX TO URINE CULTURE - Abnormal; Notable for the following components:    Occult Blood UA 2+ (*)     All other components within normal limits    Narrative:     Specimen Source->Urine    ADD ON [facility] [id number] PER DR.VOIGT QUINN  05/04/2024  14:11     ACETAMINOPHEN LEVEL - Abnormal; Notable for the following components:    Acetaminophen (Tylenol), Serum <3.0 (*)     All other components within normal limits    Narrative:     Release to patient->Immediate   PHOSPHORUS - Abnormal; Notable for the following components:    Phosphorus 2.5 (*)     All other components within normal limits    Narrative:     Release to patient->Immediate   DRUG SCREEN PANEL, URINE EMERGENCY - Abnormal; Notable for the following components:    THC Presumptive Positive (*)     All other components within normal limits    Narrative:     Specimen Source->Urine    ADD ON [facility] [id number] PER DR.VOIGT QUINN  05/04/2024  14:11    HIV 1 / 2 ANTIBODY    Narrative:     Release to patient->Immediate   HEPATITIS C ANTIBODY    Narrative:     Release to patient->Immediate   TSH    Narrative:     Release to patient->Immediate   ALCOHOL,MEDICAL (ETHANOL)    Narrative:     Release to patient->Immediate   LACTIC ACID, PLASMA   MAGNESIUM    Narrative:     Release to patient->Immediate   DRUG SCREEN PANEL, URINE EMERGENCY   URINALYSIS MICROSCOPIC    Narrative:     Specimen Source->Urine    ADD ON [facility] [id number] PER DR.VOIGT QUINN  05/04/2024  14:11    PROTIME-INR     EKG Readings: (Independently Interpreted)   Initial Reading: No STEMI. Rhythm: Normal Sinus Rhythm. Heart Rate: 65. Ectopy: No Ectopy. T Waves Flipped: I. Axis: Normal.       Imaging Results              CT Head Without Contrast (Final result)  Result time 05/04/24 15:25:25      Final result by Glenn Holm MD (05/04/24 15:25:25)                   Impression:      No acute intracranial process.      Electronically signed by: Glenn Holm  Date:    05/04/2024  Time:    15:25               Narrative:    EXAMINATION:  CT HEAD WITHOUT CONTRAST    CLINICAL HISTORY:  Seizure, new-onset, no history of trauma;    TECHNIQUE:  Low dose axial images were obtained through the head.  Coronal and sagittal reformations were also  performed. Contrast was not administered.    COMPARISON:  01/01/2020    FINDINGS:  No evidence of hydrocephalus, mass effect, intracranial hemorrhage or acute territorial infarct.    The brain parenchyma maintains normal attenuation    The calvarium is intact. The visualized sinuses and mastoid air cells are clear.                                       Medications   lactated ringers bolus 1,000 mL (0 mLs Intravenous Stopped 5/4/24 1511)   k phos di & mono-sod phos mono 250 mg tablet 2 tablet (2 tablets Oral Given 5/4/24 1658)   lactated ringers bolus 1,000 mL (0 mLs Intravenous Stopped 5/4/24 1902)     Medical Decision Making  Differential diagnosis:  Seizure-like activity, undiagnosed seizure disorder, brain injury, ICH, electrolyte derangement, acute alcohol withdrawal    Amount and/or Complexity of Data Reviewed  Labs: ordered.  Radiology: ordered.    Risk  OTC drugs.              Attending Attestation:             Attending ED Notes:   Laboratory evaluation revealed minimal macrocytic anemia. Metabolic profile reveals an elevated AST/ ALT in this patient with known alcohol and drug issues without associated abdominal pain, elevated bilirubin, or change of INR. Serum phosphorus mildly diminished and repleted orally. Patient has tolerated 2 regular meals and received fluid resuscitation in ED. CT head without evidence of injury. Patient is noted to exhibit relaxed, conversant mental status without evidence of acute withdrawal symptoms. Review of the medical record reveals multiple previous syncopal episodes in past, and patient reports that he had previously undergone an EEG reporting that he was told he did not have a seizure disorder, but was prescribed a medication which he is unable to recall. Patient exhibits normal mental status without indication for admit, nor clear indication for AED initiation from ED. Patient to return to inpatient  facility where he currently resides with instructions to take the  KPhos supplement as prescribed, avoid any alcohol or tylenol-containing medications, and follow-up with PCP next available for further evaluation, and return to ED as needed for emergent concerns.                             Clinical Impression:  Final diagnoses:  [S36.119A] Hepatic injury, initial encounter (Primary)  [R56.9] Witnessed seizure-like activity  [E83.39] Hypophosphatemia          ED Disposition Condition    Discharge Stable          ED Prescriptions       Medication Sig Dispense Start Date End Date Auth. Provider    k phos di & mono-sod phos mono (K-PHOS-NEUTRAL) 250 mg Tab Take 2 tablets by mouth once daily. for 7 days 14 tablet 5/4/2024 5/11/2024 Adryan Griffin MD          Follow-up Information       Follow up With Specialties Details Why Contact Info    Primary MD  In 1 week or once discharged from [place] for further evaluation of liver injury noted today     [facility] Emergency Medicine  As needed, If symptoms worsen [address] [facility] [id number] [phone]             Adryan Griffin MD  05/04/24 1464

## 2024-05-05 LAB
OHS QRS DURATION: 86 MS
OHS QTC CALCULATION: 418 MS

## 2024-05-05 NOTE — ED NOTES
Assumed care of patient at this time.  Patient identifiers verified and correct for Orion Skaggs.    LOC: The patient is awake, alert and oriented x 4. Pt is speaking appropriately, no slurred speech.  APPEARANCE: Patient resting comfortably and in no acute distress. Pt is clean and well groomed. No JVD visible. Pt reports pain level of 0.  SKIN: Skin is warm dry and intact, and color is consistent with ethnicity. No tenting observed and capillary refill <3 seconds. No clubbing noted to nail beds. No breakdown or brusing visible and mucus membranes moist and acyanotic.  MUSCULOSKELETAL: Full range of motion present in all extremities. Hand  equal and leg strength strong +2 bilaterally.  RESPIRATORY: Airway is open and patent. Respirations-unlabored, regular rate, equal bilaterally on inspiration and expiration. No accessory muscle use noted. Lungs clear to auscultation in all fields bilaterally anterior and posterior.   CARDIAC: Patient has regular heart rate and rhythm.  No peripheral edema noted, and patient has no c/o chest pain.  ABDOMEN: Soft and non-tender to palpation with no distention noted. Normoactive bowel sounds X4 quadrants. Pt has no complaints of abnormal bowel movements. Pt reports normal appetite.   NEUROLOGIC: Eyes open spontaneously and facial expression symmetrical. Pt behavior appropriate to situation, and pt follows commands.  Pt reports sensation present in all extremities when touched with a finger. No s/s of ischemic stroke. PERRLA  : No complaints of frequency, burning, urgency or blood in the urine.

## 2024-05-05 NOTE — DISCHARGE INSTRUCTIONS
Avoid vehicle operation, swimming, and baths as well as any heavy equipment operation until cleared by a Neurologist. Avoid drinking alcohol and taking Tylenol until instructed that you may resume practices by your physician.

## 2024-08-27 ENCOUNTER — HOSPITAL ENCOUNTER (EMERGENCY)
Facility: HOSPITAL | Age: 39
Discharge: HOME | End: 2024-08-27
Attending: EMERGENCY MEDICINE
Payer: MEDICAID

## 2024-08-27 VITALS
RESPIRATION RATE: 20 BRPM | SYSTOLIC BLOOD PRESSURE: 99 MMHG | OXYGEN SATURATION: 98 % | DIASTOLIC BLOOD PRESSURE: 55 MMHG | HEIGHT: 69 IN | WEIGHT: 165 LBS | HEART RATE: 74 BPM | TEMPERATURE: 99 F | BODY MASS INDEX: 24.44 KG/M2

## 2024-08-27 DIAGNOSIS — T63.461A WASP STING, ACCIDENTAL OR UNINTENTIONAL, INITIAL ENCOUNTER: Primary | ICD-10-CM

## 2024-08-27 DIAGNOSIS — T78.40XA ALLERGIC REACTION, INITIAL ENCOUNTER: ICD-10-CM

## 2024-08-27 PROCEDURE — 96374 THER/PROPH/DIAG INJ IV PUSH: CPT

## 2024-08-27 PROCEDURE — 99284 EMERGENCY DEPT VISIT MOD MDM: CPT | Mod: 25

## 2024-08-27 PROCEDURE — 63600175 PHARM REV CODE 636 W HCPCS: Performed by: EMERGENCY MEDICINE

## 2024-08-27 RX ORDER — METHYLPREDNISOLONE 4 MG/1
TABLET ORAL
Qty: 1 EACH | Refills: 0 | Status: SHIPPED | OUTPATIENT
Start: 2024-08-27

## 2024-08-27 RX ORDER — METHYLPREDNISOLONE SOD SUCC 125 MG
125 VIAL (EA) INJECTION
Status: COMPLETED | OUTPATIENT
Start: 2024-08-27 | End: 2024-08-27

## 2024-08-27 RX ADMIN — METHYLPREDNISOLONE SODIUM SUCCINATE 125 MG: 125 INJECTION, POWDER, FOR SOLUTION INTRAMUSCULAR; INTRAVENOUS at 07:08

## 2024-08-27 NOTE — FIRST PROVIDER EVALUATION
"Medical screening examination initiated.  I have conducted a focused provider triage encounter, findings are as follows:    Brief history of present illness:  Patient presenting by EMS after a allergic reaction.  He reports he was working outside and was stung by to yellow warranted and rapidly began to develop hives and welts all over.  He denies tongue or face swelling but does report this sensation his throat was closing.  Patient states he received IV steroids and Benadryl per EMS.  Denies any complaints at this time.    Vitals:    08/27/24 1740   BP: 106/62   Pulse: 72   Resp: 18   Temp: 98.5 °F (36.9 °C)   TempSrc: Oral   SpO2: 100%   Weight: 74.8 kg (165 lb)   Height: 5' 9" (1.753 m)       Pertinent physical exam:  Exam largely unremarkable for evidence of progressing anaphylaxis urticaria    Brief workup plan:  Progress with monitoring on telemetry for up to 4-6 hours for reassessment    Preliminary workup initiated; this workup will be continued and followed by the physician or advanced practice provider that is assigned to the patient when roomed.  "

## 2024-08-28 NOTE — DISCHARGE INSTRUCTIONS
We know that you have many choices and are honored that you chose us. We hope that we met or exceeded your expectations and goals for this visit and will keep the [facility] family in mind for your future needs and those of your family and friends.     - Dr. Guajardo

## 2024-08-28 NOTE — ED TRIAGE NOTES
Orion Skaggs, a 39 y.o. male presents to the ED w/ complaint of hives after being stung by a yellow jacket around 4pm today, pt was given IV benadryl, epi and steroids by EMS. Hives are no longer present at this time.    Triage note:  Chief Complaint   Patient presents with    Allergic Reaction     Arrived via ems from home with c/o 4 yellow jacket stings to right hand, c/o throat closing generalized hives and facial edema, received from EMS .5mg epi IM - 50 mg benadryl IV - and 16mg dexamethasone      Review of patient's allergies indicates:  No Known Allergies  Past Medical History:   Diagnosis Date    Alcohol abuse     Asthma     Back muscle spasm     Back pain     Chest pain     Drug abuse     Liver disease     Pancreatitis

## 2024-08-28 NOTE — ED PROVIDER NOTES
Emergency Department Provider Note    Orion EDIL Skaggs   39 y.o. male   [id number]      8/27/2024       History     This history was obtained from the patient without limitations.  He presented by ambulance.      He is a 39-year-old with the below past medical history. He was stung by a yellowjacket on his right hand at around 16:00 and subsequently developed swelling and pruritus to the hand.  He then developed generalized hives.  He denies throat discomfort, difficulty swallowing, palpitations, shortness of breath, nausea, and vomiting.  He received IV Benadryl during transport.  The hives have resolved.  However, his hand remains swollen and is now slightly painful.  He is ambidextrous.         Past Medical History:   Diagnosis Date    Alcohol abuse     Asthma     Back muscle spasm     Back pain     Chest pain     Drug abuse     Liver disease     Pancreatitis       History reviewed. No pertinent surgical history.   Family History   Problem Relation Name Age of Onset    Diabetes Mother        Social History     Socioeconomic History    Marital status: Legally    Tobacco Use    Smoking status: Some Days     Current packs/day: 0.25     Types: Cigarettes    Smokeless tobacco: Never    Tobacco comments:     Approximately 1 pack every 2-3 days   Substance and Sexual Activity    Alcohol use: Yes     Alcohol/week: 1.0 - 2.0 standard drink of alcohol     Types: 1 - 2 Standard drinks or equivalent per week     Comment: 3-4 days per week ; generally liquor    Drug use: Yes     Types: Marijuana, Cocaine     Comment: marijuana 3x weekly and cocaine occasionally    Sexual activity: Yes     Partners: Female      Review of patient's allergies indicates:  No Known Allergies        Physical Examination     Initial Vitals [08/27/24 1740]   BP Pulse Resp Temp SpO2   106/62 72 18 98.5 °F (36.9 °C) 100 %      MAP       --           Physical Exam    Nursing note and vitals reviewed.  Constitutional: He is not diaphoretic. No  distress.   HENT:   Mouth/Throat: Uvula is midline. No trismus in the jaw. No uvula swelling.   Cardiovascular:  Normal rate, regular rhythm and normal heart sounds.     Exam reveals no gallop and no friction rub.       No murmur heard.  Pulmonary/Chest: No stridor. No respiratory distress. He has no wheezes. He has no rhonchi.     Neurological: He is alert and oriented to person, place, and time. GCS score is 15. GCS eye subscore is 4. GCS verbal subscore is 5. GCS motor subscore is 6.   Skin: Skin is warm and dry. No pallor.   Psychiatric: He is not actively hallucinating. He is attentive.            Labs     None     Imaging     Imaging Results    None           ED Course     The patient received the following medications:  Medications   methylPREDNISolone sodium succinate injection 125 mg (125 mg Intravenous Given 8/27/24 1933)             Medical Decision Making                 Medical Decision Making  Problems Addressed:  Allergic reaction, initial encounter: acute illness or injury  Wasp sting, accidental or unintentional, initial encounter: acute illness or injury    Risk  OTC drugs.  Prescription drug management.              Diagnoses       ICD-10-CM ICD-9-CM   1. Wasp sting, accidental or unintentional, initial encounter  T63.461A 989.5     E905.3   2. Allergic reaction, initial encounter  T78.40XA 995.3         Dispostion      ED Disposition Condition    Discharge Stable            ED Prescriptions       Medication Sig Dispense Start Date End Date Auth. Provider    methylPREDNISolone (MEDROL DOSEPACK) 4 mg tablet Follow included dosing schedule. 1 each 8/27/2024 -- Aj Guajardo III, MD            Follow-up Information       Follow up With Specialties Details Why Contact Info    Your primary care provider   Schedule a close in-person or virtual ER follow-up visit with your primary care provider.     ER   Return to the ER if your condition worsens or you have any other concerns that you feel need  immediate attention.               Aj Guajardo III, MD  08/27/24 1936